# Patient Record
Sex: FEMALE | Race: WHITE | NOT HISPANIC OR LATINO | Employment: FULL TIME | URBAN - METROPOLITAN AREA
[De-identification: names, ages, dates, MRNs, and addresses within clinical notes are randomized per-mention and may not be internally consistent; named-entity substitution may affect disease eponyms.]

---

## 2017-01-23 ENCOUNTER — OFFICE VISIT (OUTPATIENT)
Dept: FAMILY MEDICINE | Facility: CLINIC | Age: 32
End: 2017-01-23
Payer: COMMERCIAL

## 2017-01-23 VITALS
BODY MASS INDEX: 21.82 KG/M2 | WEIGHT: 144 LBS | HEIGHT: 68 IN | DIASTOLIC BLOOD PRESSURE: 69 MMHG | TEMPERATURE: 98 F | HEART RATE: 72 BPM | OXYGEN SATURATION: 99 % | SYSTOLIC BLOOD PRESSURE: 116 MMHG

## 2017-01-23 DIAGNOSIS — M26.629 TMJ TENDERNESS: Primary | ICD-10-CM

## 2017-01-23 PROCEDURE — 99213 OFFICE O/P EST LOW 20 MIN: CPT | Performed by: INTERNAL MEDICINE

## 2017-01-23 RX ORDER — CYCLOBENZAPRINE HCL 10 MG
5-10 TABLET ORAL 3 TIMES DAILY PRN
Qty: 30 TABLET | Refills: 1 | Status: SHIPPED | OUTPATIENT
Start: 2017-01-23 | End: 2017-09-15

## 2017-01-23 NOTE — NURSING NOTE
"Chief Complaint   Patient presents with     Jaw Pain     x 3 months, had an ear infection and was on 3 antibiotics, still having pain in jaw and around ear.        Initial /69 mmHg  Pulse 72  Temp(Src) 98  F (36.7  C) (Tympanic)  Ht 5' 7.75\" (1.721 m)  Wt 144 lb (65.318 kg)  BMI 22.05 kg/m2  SpO2 99% Estimated body mass index is 22.05 kg/(m^2) as calculated from the following:    Height as of this encounter: 5' 7.75\" (1.721 m).    Weight as of this encounter: 144 lb (65.318 kg).  BP completed using cuff size: annamarie BRICE CMA ([facility])          "

## 2017-01-23 NOTE — MR AVS SNAPSHOT
After Visit Summary   1/23/2017    Jonna Pritchard    MRN: [id number]           Patient Information     Date Of Birth          1985        Visit Information        Provider Department      1/23/2017 2:40 PM Mark Chua MD [facility]        Today's Diagnoses     TMJ tenderness    -  1       Care Instructions    The pain may be coming from tightness in the muscles of the jaw- we will try a muscle relaxer to see if that helps.  If not, I would recommend revisiting with your dentist to get a night mouth guard to help with possible clenching of the jaw at night that might be causing the pain.        Follow-ups after your visit        Your next 10 appointments already scheduled     Jan 27, 2017  2:20 PM   PHYSICAL with Iván Traylor [facility]   [facility]    [address] [address] [phone]              Who to contact     If you have questions or need follow up information about today's clinic visit or your schedule please contact [facility] directly at [phone].  Normal or non-critical lab and imaging results will be communicated to you by Liftopiahart, letter or phone within 4 business days after the clinic has received the results. If you do not hear from us within 7 days, please contact the clinic through LumeJett or phone. If you have a critical or abnormal lab result, we will notify you by phone as soon as possible.  Submit refill requests through [email or web address] or call your pharmacy and they will forward the refill request to us. Please allow 3 business days for your refill to be completed.          Additional Information About Your Visit        MyChart Information     [email or web address] gives you secure access to your electronic health record. If you see a primary care provider, you can also send messages to your care team and make appointments. If you have questions, please call your primary care clinic.  If you do  "not have a primary care provider, please call [phone] and they will assist you.        Care EveryWhere ID     This is your Care EveryWhere ID. This could be used by other organizations to access your [facility] medical records  [id number]        Your Vitals Were     Pulse Temperature Height BMI (Body Mass Index) Pulse Oximetry       72 98  F (36.7  C) (Tympanic) 5' 7.75\" (1.721 m) 22.05 kg/m2 99%        Blood Pressure from Last 3 Encounters:   01/23/17 116/69   11/21/16 114/67   11/01/16 118/67    Weight from Last 3 Encounters:   01/23/17 144 lb (65.318 kg)   11/21/16 147 lb (66.679 kg)   11/01/16 146 lb (66.225 kg)              Today, you had the following     No orders found for display         Today's Medication Changes          These changes are accurate as of: 1/23/17  3:05 PM.  If you have any questions, ask your nurse or doctor.               Start taking these medicines.        Dose/Directions    cyclobenzaprine 10 MG tablet   Commonly known as:  FLEXERIL   Used for:  TMJ tenderness   Started by:  Mark Chua MD        Dose:  5-10 mg   Take 0.5-1 tablets (5-10 mg) by mouth 3 times daily as needed for muscle spasms   Quantity:  30 tablet   Refills:  1            Where to get your medicines      These medications were sent to [facility] [address]  [address] [address]     Phone:  [phone]    - cyclobenzaprine 10 MG tablet             Primary Care Provider Office Phone # Fax #    Missael Toth [phone]       [facility] [address] [address]        Thank you!     Thank you for choosing [facility]  for your care. Our goal is always to provide you with excellent care. Hearing back from our patients is one way we can continue to improve our services. Please take a few minutes to complete the written survey that you may receive in the mail after your visit with us. Thank you!   "           Your Updated Medication List - Protect others around you: Learn how to safely use, store and throw away your medicines at www.disposemymeds.org.          This list is accurate as of: 1/23/17  3:05 PM.  Always use your most recent med list.                   Brand Name Dispense Instructions for use    albuterol 108 (90 BASE) MCG/ACT Inhaler    PROAIR HFA/PROVENTIL HFA/VENTOLIN HFA    1 Inhaler    Inhale 2 puffs into the lungs every 6 hours as needed for shortness of breath / dyspnea or wheezing       CALCIUM PO          cyclobenzaprine 10 MG tablet    FLEXERIL    30 tablet    Take 0.5-1 tablets (5-10 mg) by mouth 3 times daily as needed for muscle spasms       MULTIVITAMIN ADULT PO

## 2017-01-23 NOTE — PROGRESS NOTES
SUBJECTIVE:                                                    Jonna Pritchard is a 31 year old female who presents to clinic today for the following health issues:      Concern - left side jaw pain - no known injury     Onset: x 3 months      Description:   Patient reports having ear infection in October, was on 3 antibiotics, still having residual jaw pain and left around ear.  Pain increased w/ jaw movement.  As of recent, pain increased when laying down on right side, feels shooting pain in left jaw and shooting/stabbing pain to head.     Intensity: mild, moderate     Progression of Symptoms:  same    Accompanying Signs & Symptoms:  No swelling, no clicking in jaw.   Throat has been scratchy for a couple days       Previous history of similar problem:   None     Precipitating factors:   Worsened by: opening jaw, eating, laying on right side,     Alleviating factors:  Improved by: none        Therapies Tried and outcome: heat did not help, ibuprofen helped some    She did see her dentist recently, and she does not have any cavities.  They did discuss possibly trying a night mouth guard, but she would not be able to wear her retainers with this.    Problem list and histories reviewed & adjusted, as indicated.  Additional history: as documented    Current Outpatient Prescriptions   Medication Sig Dispense Refill     cyclobenzaprine (FLEXERIL) 10 MG tablet Take 0.5-1 tablets (5-10 mg) by mouth 3 times daily as needed for muscle spasms 30 tablet 1     Multiple Vitamins-Minerals (MULTIVITAMIN ADULT PO)        CALCIUM PO        albuterol (PROAIR HFA, PROVENTIL HFA, VENTOLIN HFA) 108 (90 BASE) MCG/ACT inhaler Inhale 2 puffs into the lungs every 6 hours as needed for shortness of breath / dyspnea or wheezing 1 Inhaler 0     Allergies   Allergen Reactions     Penicillins        ROS:  Constitutional, HEENT systems are negative, except as otherwise noted.    OBJECTIVE:                                                    BP  "116/69 mmHg  Pulse 72  Temp(Src) 98  F (36.7  C) (Tympanic)  Ht 5' 7.75\" (1.721 m)  Wt 144 lb (65.318 kg)  BMI 22.05 kg/m2  SpO2 99%  Body mass index is 22.05 kg/(m^2).  GENERAL: healthy, alert and no distress  HENT: ear canals and TM's normal, nose and mouth without ulcers or lesions, some tightness in muscle below left ear    Diagnostic Test Results:  none      ASSESSMENT/PLAN:                                                        1. TMJ tenderness    She has some tightness in the muscles behind the left TMJ.  Will try some Flexeril to see if that will help relax that muscle.  If no improvement, I recommended she follow-up with her dentist to get the night mouth guard because she may be clenching her jaw at night causing TMJ pain.  Ear looks okay today- do no suspect infection.    - cyclobenzaprine (FLEXERIL) 10 MG tablet; Take 0.5-1 tablets (5-10 mg) by mouth 3 times daily as needed for muscle spasms  Dispense: 30 tablet; Refill: 1    Follow-up as needed.     Mark Chua MD  [facility]  "

## 2017-01-23 NOTE — PATIENT INSTRUCTIONS
The pain may be coming from tightness in the muscles of the jaw- we will try a muscle relaxer to see if that helps.  If not, I would recommend revisiting with your dentist to get a night mouth guard to help with possible clenching of the jaw at night that might be causing the pain.

## 2017-03-06 ENCOUNTER — OFFICE VISIT (OUTPATIENT)
Dept: OBGYN | Facility: CLINIC | Age: 32
End: 2017-03-06
Payer: COMMERCIAL

## 2017-03-06 VITALS
SYSTOLIC BLOOD PRESSURE: 128 MMHG | BODY MASS INDEX: 21.61 KG/M2 | HEIGHT: 68 IN | DIASTOLIC BLOOD PRESSURE: 78 MMHG | WEIGHT: 142.6 LBS | HEART RATE: 93 BPM

## 2017-03-06 DIAGNOSIS — Z01.411 ENCOUNTER FOR GYNECOLOGICAL EXAMINATION WITH ABNORMAL FINDING: Primary | ICD-10-CM

## 2017-03-06 DIAGNOSIS — N89.8 VAGINAL DRYNESS: ICD-10-CM

## 2017-03-06 DIAGNOSIS — N94.19 DYSPAREUNIA DUE TO MEDICAL CONDITION IN FEMALE: ICD-10-CM

## 2017-03-06 DIAGNOSIS — B37.31 CANDIDIASIS OF VULVA AND VAGINA: ICD-10-CM

## 2017-03-06 LAB
MICRO REPORT STATUS: ABNORMAL
SPECIMEN SOURCE: ABNORMAL
WET PREP SPEC: ABNORMAL

## 2017-03-06 PROCEDURE — G0145 SCR C/V CYTO,THINLAYER,RESCR: HCPCS | Performed by: OBSTETRICS & GYNECOLOGY

## 2017-03-06 PROCEDURE — 87210 SMEAR WET MOUNT SALINE/INK: CPT | Performed by: OBSTETRICS & GYNECOLOGY

## 2017-03-06 PROCEDURE — 87624 HPV HI-RISK TYP POOLED RSLT: CPT | Performed by: OBSTETRICS & GYNECOLOGY

## 2017-03-06 PROCEDURE — 99213 OFFICE O/P EST LOW 20 MIN: CPT | Mod: 25 | Performed by: OBSTETRICS & GYNECOLOGY

## 2017-03-06 PROCEDURE — 99385 PREV VISIT NEW AGE 18-39: CPT | Performed by: OBSTETRICS & GYNECOLOGY

## 2017-03-06 RX ORDER — ESTRADIOL 0.5 MG/1
TABLET ORAL
Qty: 14 TABLET | Refills: 1 | Status: SHIPPED | OUTPATIENT
Start: 2017-03-06 | End: 2017-09-15

## 2017-03-06 RX ORDER — FLUCONAZOLE 150 MG/1
TABLET ORAL
Qty: 30 TABLET | Refills: 1 | Status: SHIPPED | OUTPATIENT
Start: 2017-03-06 | End: 2017-09-15

## 2017-03-06 NOTE — NURSING NOTE
"Chief Complaint   Patient presents with     Physical      pain since daughter, painful intercourse       Initial /78 (BP Location: Right arm, Patient Position: Chair, Cuff Size: Adult Regular)  Pulse 93  Ht 5' 8\" (1.727 m)  Wt 142 lb 9.6 oz (64.7 kg)  LMP 2017 (Approximate)  BMI 21.68 kg/m2 Estimated body mass index is 21.68 kg/(m^2) as calculated from the following:    Height as of this encounter: 5' 8\" (1.727 m).    Weight as of this encounter: 142 lb 9.6 oz (64.7 kg).  Medication Reconciliation: complete     Lucita Hand LPN      "

## 2017-03-06 NOTE — PATIENT INSTRUCTIONS
Preventive Health Recommendations  Female Ages 26 - 39  Yearly exam:   See your health care provider every year in order to    Review health changes.     Discuss preventive care.      Review your medicines if you your doctor has prescribed any.    Until age 30: Get a Pap test every three years (more often if you have had an abnormal result).    After age 30: Talk to your doctor about whether you should have a Pap test every 3 years or have a Pap test with HPV screening every 5 years.   You do not need a Pap test if your uterus was removed (hysterectomy) and you have not had cancer.  You should be tested each year for STDs (sexually transmitted diseases), if you're at risk.   Talk to your provider about how often to have your cholesterol checked.  If you are at risk for diabetes, you should have a diabetes test (fasting glucose).  Shots: Get a flu shot each year. Get a tetanus shot every 10 years.   Nutrition:     Eat at least 5 servings of fruits and vegetables each day.    Eat whole-grain bread, whole-wheat pasta and brown rice instead of white grains and rice.    Talk to your provider about Calcium and Vitamin D.     Lifestyle    Exercise at least 150 minutes a week (30 minutes a day, 5 days of the week). This will help you control your weight and prevent disease.    Limit alcohol to one drink per day.    No smoking.     Wear sunscreen to prevent skin cancer.    See your dentist every six months for an exam and cleaning.  1.  Estradiol 0.5 mg vaginally daily for 2 weeks  2.  Lidocaine jelly place on area of tenderness 1 hour prior to intercourse  3.  Vaginal dilators-start with smallest use for 10-30 minutes every day until comfortable and then increase to next largest size.    4.  F/u in 2 months

## 2017-03-06 NOTE — MR AVS SNAPSHOT
After Visit Summary   3/6/2017    Jonna Pritchard    MRN: [id number]           Patient Information     Date Of Birth          1985        Visit Information        Provider Department      3/6/2017 9:00 AM Shawanda Breaux MD [facility]        Today's Diagnoses     Encounter for gynecological examination with abnormal finding    -  1    Dyspareunia due to medical condition in female        Vaginal dryness          Care Instructions      Preventive Health Recommendations  Female Ages 26 - 39  Yearly exam:   See your health care provider every year in order to    Review health changes.     Discuss preventive care.      Review your medicines if you your doctor has prescribed any.    Until age 30: Get a Pap test every three years (more often if you have had an abnormal result).    After age 30: Talk to your doctor about whether you should have a Pap test every 3 years or have a Pap test with HPV screening every 5 years.   You do not need a Pap test if your uterus was removed (hysterectomy) and you have not had cancer.  You should be tested each year for STDs (sexually transmitted diseases), if you're at risk.   Talk to your provider about how often to have your cholesterol checked.  If you are at risk for diabetes, you should have a diabetes test (fasting glucose).  Shots: Get a flu shot each year. Get a tetanus shot every 10 years.   Nutrition:     Eat at least 5 servings of fruits and vegetables each day.    Eat whole-grain bread, whole-wheat pasta and brown rice instead of white grains and rice.    Talk to your provider about Calcium and Vitamin D.     Lifestyle    Exercise at least 150 minutes a week (30 minutes a day, 5 days of the week). This will help you control your weight and prevent disease.    Limit alcohol to one drink per day.    No smoking.     Wear sunscreen to prevent skin cancer.    See your dentist every six months for an exam and cleaning.  1.  Estradiol  "0.5 mg vaginally daily for 2 weeks  2.  Lidocaine jelly place on area of tenderness 1 hour prior to intercourse  3.  Vaginal dilators-start with smallest use for 10-30 minutes every day until comfortable and then increase to next largest size.    4.  F/u in 2 months          Follow-ups after your visit        Who to contact     If you have questions or need follow up information about today's clinic visit or your schedule please contact [facility] directly at [phone].  Normal or non-critical lab and imaging results will be communicated to you by Mirador Financialhart, letter or phone within 4 business days after the clinic has received the results. If you do not hear from us within 7 days, please contact the clinic through Cinexio or phone. If you have a critical or abnormal lab result, we will notify you by phone as soon as possible.  Submit refill requests through Cinexio or call your pharmacy and they will forward the refill request to us. Please allow 3 business days for your refill to be completed.          Additional Information About Your Visit        Cinexio Information     C[employer] gives you secure access to your electronic health record. If you see a primary care provider, you can also send messages to your care team and make appointments. If you have questions, please call your primary care clinic.  If you do not have a primary care provider, please call [phone] and they will assist you.        Care EveryWhere ID     This is your Care EveryWhere ID. This could be used by other organizations to access your [facility] medical records  [id number]        Your Vitals Were     Pulse Height Last Period BMI (Body Mass Index)          93 5' 8\" (1.727 m) 02/17/2017 (Approximate) 21.68 kg/m2         Blood Pressure from Last 3 Encounters:   03/06/17 128/78   01/23/17 116/69   11/21/16 114/67    Weight from Last 3 Encounters:   03/06/17 142 lb 9.6 oz (64.7 kg)   01/23/17 144 lb (65.3 kg)   11/21/16 147 lb " (66.7 kg)              We Performed the Following     HPV High Risk Types DNA Cervical     Pap imaged thin layer diagnostic with HPV (select HPV order below)     Wet prep          Today's Medication Changes          These changes are accurate as of: 3/6/17  9:37 AM.  If you have any questions, ask your nurse or doctor.               Start taking these medicines.        Dose/Directions    estradiol 0.5 MG tablet   Commonly known as:  ESTRACE   Used for:  Dyspareunia due to medical condition in female, Vaginal dryness   Started by:  Shawanda Breaux MD        Place vaginally one daily for 2 weeks   Quantity:  14 tablet   Refills:  1       lidocaine 2 % topical gel   Commonly known as:  XYLOCAINE   Used for:  Vaginal dryness, Dyspareunia due to medical condition in female   Started by:  Shawanda Breaux MD        Place on the painful area 1 hour prior to intercourse   Quantity:  30 mL   Refills:  3            Where to get your medicines      These medications were sent to [facility] [address]  [address] [address]     Phone:  [phone]     estradiol 0.5 MG tablet    lidocaine 2 % topical gel                Primary Care Provider Office Phone # Fax #    Missael Toth [phone]       [facility] [address] [address]        Thank you!     Thank you for choosing [facility]  for your care. Our goal is always to provide you with excellent care. Hearing back from our patients is one way we can continue to improve our services. Please take a few minutes to complete the written survey that you may receive in the mail after your visit with us. Thank you!             Your Updated Medication List - Protect others around you: Learn how to safely use, store and throw away your medicines at www.disposemymeds.org.          This list is accurate as of: 3/6/17  9:37 AM.  Always  use your most recent med list.                   Brand Name Dispense Instructions for use    albuterol 108 (90 BASE) MCG/ACT Inhaler    PROAIR HFA/PROVENTIL HFA/VENTOLIN HFA    1 Inhaler    Inhale 2 puffs into the lungs every 6 hours as needed for shortness of breath / dyspnea or wheezing       CALCIUM PO          cyclobenzaprine 10 MG tablet    FLEXERIL    30 tablet    Take 0.5-1 tablets (5-10 mg) by mouth 3 times daily as needed for muscle spasms       estradiol 0.5 MG tablet    ESTRACE    14 tablet    Place vaginally one daily for 2 weeks       lidocaine 2 % topical gel    XYLOCAINE    30 mL    Place on the painful area 1 hour prior to intercourse       MULTIVITAMIN ADULT PO

## 2017-03-06 NOTE — PROGRESS NOTES
Call to pt to notify of below.  Unable to reach.  Left message for pt to call back     Nusrat Inman   [facility]  RN

## 2017-03-06 NOTE — PROGRESS NOTES
SUBJECTIVE:     CC: Jonna Pritchard is an 31 year old woman who presents for preventive health visit.  She has been having problems with intercourse.  She has always been tight and dry.  It has been worse recently.  It was a long time after she had her prior delivery. It hurts upon entry and can't get in all the way.  It hurts like its rubbing.  A few months ago she felt like she had a yeast infection that resolved.  No vaginal discharge but more sensitive.  It hurt when it got wet.  She having normal menstrual cycles.  She is not presently using contraception, condoms being used.  She was on contraception before.  It took her 3 1/2 years to get pregnant prior.  Her scar still hurts on the sides but it is improving.  It has only happened once or twice the last few months.  She has trouble with tampons because she is so dry.     Healthy Habits:    Do you get at least three servings of calcium containing foods daily (dairy, green leafy vegetables, etc.)? Yes, taking vitamins    Amount of exercise or daily activities, outside of work: 4 day(s) per week    Problems taking medications regularly No, not taking flexeril due to being too sleepy    Medication side effects: No    Have you had an eye exam in the past two years? yes    Do you see a dentist twice per year? yes    Do you have sleep apnea, excessive snoring or daytime drowsiness?no        Painful intercourse, pain at csection site    Today's PHQ-2 Score:   PHQ-2 ( 1999 Pfizer) 3/6/2017   Q1: Little interest or pleasure in doing things 0   Q2: Feeling down, depressed or hopeless 0   PHQ-2 Score 0       Abuse: Current or Past(Physical, Sexual or Emotional)- No  Do you feel safe in your environment - Yes    Social History   Substance Use Topics     Smoking status: Former Smoker     Packs/day: 0.00     Years: 8.00     Quit date: 1/1/2011     Smokeless tobacco: Not on file     Alcohol use 0.0 oz/week     0 Standard drinks or equivalent per week      Comment: rarely  "    The patient does not drink >3 drinks per day nor >7 drinks per week.    No results for input(s): CHOL, HDL, LDL, TRIG, CHOLHDLRATIO, NHDL in the last 31995 hours.    Reviewed orders with patient.  Reviewed health maintenance and updated orders accordingly - Yes    Mammo Decision Support:  Mammogram not appropriate for this patient based on age.    Pertinent mammograms are reviewed under the imaging tab.  History of abnormal Pap smear: NO - age 30- 65 PAP every 3 years recommended    Reviewed and updated as needed this visit by clinical staff  Tobacco  Allergies  Meds  Med Hx  Surg Hx  Fam Hx  Soc Hx        Reviewed and updated as needed this visit by Provider        History reviewed. No pertinent past medical history.   Past Surgical History   Procedure Laterality Date      section  2015       ROS:  C: NEGATIVE for fever, chills, change in weight  I: NEGATIVE for worrisome rashes, moles or lesions  E: NEGATIVE for vision changes or irritation  ENT: NEGATIVE for ear, mouth and throat problems  R: NEGATIVE for significant cough or SOB  B: NEGATIVE for masses, tenderness or discharge  CV: NEGATIVE for chest pain, palpitations or peripheral edema  GI: NEGATIVE for nausea, abdominal pain, heartburn, or change in bowel habits  : NEGATIVE for unusual urinary or vaginal symptoms. Periods are regular.  M: NEGATIVE for significant arthralgias or myalgia  N: NEGATIVE for weakness, dizziness or paresthesias  P: NEGATIVE for changes in mood or affect    Problem list, Medication list, Allergies, and Medical/Social/Surgical histories reviewed in [facility] and updated as appropriate.  OBJECTIVE:     /78 (BP Location: Right arm, Patient Position: Chair, Cuff Size: Adult Regular)  Pulse 93  Ht 5' 8\" (1.727 m)  Wt 142 lb 9.6 oz (64.7 kg)  LMP 2017 (Approximate)  BMI 21.68 kg/m2  EXAM:  GENERAL: healthy, alert and no distress  EYES: Eyes grossly normal to inspection, PERRL and conjunctivae and sclerae " normal  HENT: ear canals and TM's normal, nose and mouth without ulcers or lesions  NECK: no adenopathy, no asymmetry, masses, or scars and thyroid normal to palpation  RESP: lungs clear to auscultation - no rales, rhonchi or wheezes  BREAST: normal without masses, tenderness or nipple discharge and no palpable axillary masses or adenopathy  CV: regular rate and rhythm, normal S1 S2, no S3 or S4, no murmur, click or rub, no peripheral edema and peripheral pulses strong  ABDOMEN: soft, nontender, no hepatosplenomegaly, no masses and bowel sounds normal   (female): normal female external genitalia, normal urethral meatus, vaginal mucosa pale, dry, thin, and normal cervix/adnexa/uterus without masses or discharge  Noted with exam prominent posterior vaginal muscles, no masses, able to smooth down that area, hymen normal and open  Rectum-no masses  MS: no gross musculoskeletal defects noted, no edema  SKIN: no suspicious lesions or rashes  NEURO: Normal strength and tone, mentation intact and speech normal  PSYCH: mentation appears normal, affect normal/bright    ASSESSMENT/PLAN:     ASSESSMENT / PLAN:  (Z01.411) Encounter for gynecological examination with abnormal finding  (primary encounter diagnosis)  Comment: normal except for the vaginal atrophy  Plan: see below    (N94.19) Dyspareunia due to medical condition in female  Comment: due to the prominent muscularity and the dryness  Plan: estradiol (ESTRACE) 0.5 MG tablet, lidocaine         (XYLOCAINE) 2 % topical gel            (N89.8) Vaginal dryness  Comment: see below  Plan: estradiol (ESTRACE) 0.5 MG tablet, lidocaine         (XYLOCAINE) 2 % topical gel            1.  Estradiol 0.5 mg vaginally daily for 2 weeks  2.  Lidocaine jelly place on area of tenderness 1 hour prior to intercourse  3.  Vaginal dilators-start with smallest use for 10-30 minutes every day until comfortable and then increase to next largest size.    4.  F/u in 2 months          COUNSELING:  "  Reviewed preventive health counseling, as reflected in patient instructions         reports that she quit smoking about 6 years ago. She smoked 0.00 packs per day for 8.00 years. She does not have any smokeless tobacco history on file.    Estimated body mass index is 21.68 kg/(m^2) as calculated from the following:    Height as of this encounter: 5' 8\" (1.727 m).    Weight as of this encounter: 142 lb 9.6 oz (64.7 kg).       Counseling Resources:  ATP IV Guidelines  Pooled Cohorts Equation Calculator  Breast Cancer Risk Calculator  FRAX Risk Assessment  ICSI Preventive Guidelines  Dietary Guidelines for Americans, 2010  USDA's MyPlate  ASA Prophylaxis  Lung CA Screening    Shawanda Breaux MD  [facility]  "

## 2017-03-08 LAB
COPATH REPORT: NORMAL
PAP: NORMAL

## 2017-03-09 LAB
FINAL DIAGNOSIS: NORMAL
HPV HR 12 DNA CVX QL NAA+PROBE: NEGATIVE
HPV16 DNA SPEC QL NAA+PROBE: NEGATIVE
HPV18 DNA SPEC QL NAA+PROBE: NEGATIVE
SPECIMEN DESCRIPTION: NORMAL

## 2017-09-15 ENCOUNTER — OFFICE VISIT (OUTPATIENT)
Dept: FAMILY MEDICINE | Facility: CLINIC | Age: 32
End: 2017-09-15
Payer: COMMERCIAL

## 2017-09-15 VITALS
BODY MASS INDEX: 21.65 KG/M2 | HEART RATE: 82 BPM | SYSTOLIC BLOOD PRESSURE: 120 MMHG | TEMPERATURE: 98.4 F | WEIGHT: 142.4 LBS | DIASTOLIC BLOOD PRESSURE: 69 MMHG

## 2017-09-15 DIAGNOSIS — F41.9 ANXIETY: Primary | ICD-10-CM

## 2017-09-15 PROCEDURE — 99214 OFFICE O/P EST MOD 30 MIN: CPT | Performed by: FAMILY MEDICINE

## 2017-09-15 ASSESSMENT — ANXIETY QUESTIONNAIRES
3. WORRYING TOO MUCH ABOUT DIFFERENT THINGS: NEARLY EVERY DAY
1. FEELING NERVOUS, ANXIOUS, OR ON EDGE: NEARLY EVERY DAY
7. FEELING AFRAID AS IF SOMETHING AWFUL MIGHT HAPPEN: NEARLY EVERY DAY
5. BEING SO RESTLESS THAT IT IS HARD TO SIT STILL: NOT AT ALL
6. BECOMING EASILY ANNOYED OR IRRITABLE: MORE THAN HALF THE DAYS
GAD7 TOTAL SCORE: 16
2. NOT BEING ABLE TO STOP OR CONTROL WORRYING: NEARLY EVERY DAY
IF YOU CHECKED OFF ANY PROBLEMS ON THIS QUESTIONNAIRE, HOW DIFFICULT HAVE THESE PROBLEMS MADE IT FOR YOU TO DO YOUR WORK, TAKE CARE OF THINGS AT HOME, OR GET ALONG WITH OTHER PEOPLE: NOT DIFFICULT AT ALL

## 2017-09-15 ASSESSMENT — PATIENT HEALTH QUESTIONNAIRE - PHQ9
5. POOR APPETITE OR OVEREATING: MORE THAN HALF THE DAYS
SUM OF ALL RESPONSES TO PHQ QUESTIONS 1-9: 7

## 2017-09-15 NOTE — MR AVS SNAPSHOT
After Visit Summary   9/15/2017    Jonna Pritchard    MRN: [id number]           Patient Information     Date Of Birth          1985        Visit Information        Provider Department      9/15/2017 9:20 AM Loretta Andrews MD [facility]        Today's Diagnoses     Anxiety    -  1       Follow-ups after your visit        Who to contact     If you have questions or need follow up information about today's clinic visit or your schedule please contact [facility] directly at [phone].  Normal or non-critical lab and imaging results will be communicated to you by Fragegghart, letter or phone within 4 business days after the clinic has received the results. If you do not hear from us within 7 days, please contact the clinic through Fragegghart or phone. If you have a critical or abnormal lab result, we will notify you by phone as soon as possible.  Submit refill requests through [employer] ([employer]) or call your pharmacy and they will forward the refill request to us. Please allow 3 business days for your refill to be completed.          Additional Information About Your Visit        MyChart Information     [employer] ([employer]) gives you secure access to your electronic health record. If you see a primary care provider, you can also send messages to your care team and make appointments. If you have questions, please call your primary care clinic.  If you do not have a primary care provider, please call [phone] and they will assist you.        Care EveryWhere ID     This is your Care EveryWhere ID. This could be used by other organizations to access your [facility] medical records  [id number]        Your Vitals Were     Pulse Temperature BMI (Body Mass Index)             82 98.4  F (36.9  C) (Tympanic) 21.65 kg/m2          Blood Pressure from Last 3 Encounters:   09/15/17 120/69   03/06/17 128/78   01/23/17 116/69    Weight from Last 3 Encounters:   09/15/17 142 lb 6.4 oz (64.6 kg)   03/06/17 142  lb 9.6 oz (64.7 kg)   01/23/17 144 lb (65.3 kg)              Today, you had the following     No orders found for display         Today's Medication Changes          These changes are accurate as of: 9/15/17  9:43 AM.  If you have any questions, ask your nurse or doctor.               Start taking these medicines.        Dose/Directions    FLUoxetine 20 MG capsule   Commonly known as:  PROzac   Used for:  Anxiety   Started by:  Loretta Andrews MD        Dose:  20 mg   Take 1 capsule (20 mg) by mouth daily   Quantity:  30 capsule   Refills:  11         Stop taking these medicines if you haven't already. Please contact your care team if you have questions.     albuterol 108 (90 BASE) MCG/ACT Inhaler   Commonly known as:  PROAIR HFA/PROVENTIL HFA/VENTOLIN HFA   Stopped by:  Loretta Andrews MD           cyclobenzaprine 10 MG tablet   Commonly known as:  FLEXERIL   Stopped by:  Loretta Andrews MD           estradiol 0.5 MG tablet   Commonly known as:  ESTRACE   Stopped by:  Loretta Andrews MD           fluconazole 150 MG tablet   Commonly known as:  DIFLUCAN   Stopped by:  Loretta Andrews MD           lidocaine 2 % topical gel   Commonly known as:  XYLOCAINE   Stopped by:  Loretta Andrews MD                Where to get your medicines      These medications were sent to [facility] [address] [facility]  [address] [place] [address]     Phone:  [phone]     FLUoxetine 20 MG capsule                Primary Care Provider Office Phone # Fax #    Missael Hood Toth [phone]       [address] [facility] [address]        Equal Access to Services     YAHIR BISHOP AH: Hadii porfirio barillas Sodwayne, waaxda luqadaha, qaybta kaalmada paolo morales. So [facility] [phone].    ATENCIÓN: Si habla español, tiene a fry disposición servicios gratuitos de asistencia lingüística. Llame al [phone].    We comply  with applicable federal civil rights laws and [place] laws. We do not discriminate on the basis of race, color, national origin, age, disability sex, sexual orientation or gender identity.            Thank you!     Thank you for choosing [facility]  for your care. Our goal is always to provide you with excellent care. Hearing back from our patients is one way we can continue to improve our services. Please take a few minutes to complete the written survey that you may receive in the mail after your visit with us. Thank you!             Your Updated Medication List - Protect others around you: Learn how to safely use, store and throw away your medicines at www.disposemymeds.org.          This list is accurate as of: 9/15/17  9:43 AM.  Always use your most recent med list.                   Brand Name Dispense Instructions for use Diagnosis    CALCIUM PO           FLUoxetine 20 MG capsule    PROzac    30 capsule    Take 1 capsule (20 mg) by mouth daily    Anxiety       IBUPROFEN PO      Take by mouth as needed for moderate pain        MULTIVITAMIN ADULT PO

## 2017-09-15 NOTE — NURSING NOTE
"Initial /69  Pulse 82  Temp 98.4  F (36.9  C) (Tympanic)  Wt 142 lb 6.4 oz (64.6 kg)  BMI 21.65 kg/m2 Estimated body mass index is 21.65 kg/(m^2) as calculated from the following:    Height as of 3/6/17: 5' 8\" (1.727 m).    Weight as of this encounter: 142 lb 6.4 oz (64.6 kg). .      "

## 2017-09-15 NOTE — PROGRESS NOTES
SUBJECTIVE:                                                    Jonna Pritchard is 32 year old female   Chief Complaint   Patient presents with     Anxiety     Possible Anxiety and Panic Attacks    Has history of OCD with repetitive checking.   This has worsened since having a child  Feels out of breath, cannot sleep, cannot control worrying, very anxious, clenching jaw more  Migraines have worsened.  Was put on an inhaler, does not feel that this is helping       Problem list and histories reviewed & adjusted, as indicated.  Additional history: worries about toddler, will not let anyone take care of her, admits needs a break    Patient Active Problem List   Diagnosis     History of  section     Past Surgical History:   Procedure Laterality Date      SECTION  2015       Social History   Substance Use Topics     Smoking status: Former Smoker     Packs/day: 0.00     Years: 8.00     Quit date: 2011     Smokeless tobacco: Never Used     Alcohol use 0.0 oz/week     0 Standard drinks or equivalent per week      Comment: rarely     Family History   Problem Relation Age of Onset     DIABETES Father 45     Hyperlipidemia Father      DIABETES Maternal Grandfather      DIABETES Paternal Grandmother      Hyperlipidemia Paternal Grandmother      Family History Negative Mother      Hyperlipidemia Mother          Current Outpatient Prescriptions   Medication Sig Dispense Refill     IBUPROFEN PO Take by mouth as needed for moderate pain       FLUoxetine (PROZAC) 20 MG capsule Take 1 capsule (20 mg) by mouth daily 30 capsule 11     Multiple Vitamins-Minerals (MULTIVITAMIN ADULT PO)        CALCIUM PO        Allergies   Allergen Reactions     Penicillins      No lab results found.   BP Readings from Last 3 Encounters:   09/15/17 120/69   17 128/78   17 116/69    Wt Readings from Last 3 Encounters:   09/15/17 142 lb 6.4 oz (64.6 kg)   17 142 lb 9.6 oz (64.7 kg)   17 144 lb (65.3 kg)          ROS:  Constitutional, HEENT, cardiovascular, pulmonary, gi and gu systems are negative, except as otherwise noted.    OBJECTIVE:                                                    /69  Pulse 82  Temp 98.4  F (36.9  C) (Tympanic)  Wt 142 lb 6.4 oz (64.6 kg)  BMI 21.65 kg/m2  GENERAL APPEARANCE ADULT: Alert, no acute distress  PSYCH: mentation appears normal., anxious, gets more anxious as toddler gets restless  Diagnostic Test Results:  PHQ-9 Interpretation:  0-9  Not Major Depression  10-19  Mild/Moderate Depression monthly contacts, meds and therapy  20-27  Severe Depression, weekly contacts, meds and therapy  No flowsheet data found.GAD7 score   Interpretation:    0-5  mild anxiety,   6-10 moderate anxiety   11-15 severe anxiety  Last 3 GAD7 scores on record =  No flowsheet data found.       ASSESSMENT/PLAN:                                                    1. Anxiety  Severe, with mild depression.  Recheck in 2 weeks.  COGNATIVE BEHAVIORAL THERAPY and meditation options discussed, declined.  - FLUoxetine (PROZAC) 20 MG capsule; Take 1 capsule (20 mg) by mouth daily  Dispense: 30 capsule; Refill: 11    Loretta Andrews MD  [facility]

## 2017-09-16 ASSESSMENT — ANXIETY QUESTIONNAIRES: GAD7 TOTAL SCORE: 16

## 2017-09-18 PROBLEM — F41.9 ANXIETY: Status: ACTIVE | Noted: 2017-09-18

## 2017-09-19 ENCOUNTER — OFFICE VISIT (OUTPATIENT)
Dept: FAMILY MEDICINE | Facility: CLINIC | Age: 32
End: 2017-09-19
Payer: COMMERCIAL

## 2017-09-19 VITALS
WEIGHT: 142 LBS | SYSTOLIC BLOOD PRESSURE: 106 MMHG | DIASTOLIC BLOOD PRESSURE: 70 MMHG | BODY MASS INDEX: 21.59 KG/M2 | TEMPERATURE: 98.6 F

## 2017-09-19 DIAGNOSIS — G43.809 OTHER MIGRAINE WITHOUT STATUS MIGRAINOSUS, NOT INTRACTABLE: ICD-10-CM

## 2017-09-19 DIAGNOSIS — F41.9 ANXIETY: Primary | ICD-10-CM

## 2017-09-19 PROCEDURE — 99214 OFFICE O/P EST MOD 30 MIN: CPT | Performed by: FAMILY MEDICINE

## 2017-09-19 RX ORDER — SUMATRIPTAN 25 MG/1
25-50 TABLET, FILM COATED ORAL
Qty: 18 TABLET | Refills: 1 | Status: SHIPPED | OUTPATIENT
Start: 2017-09-19 | End: 2021-04-16

## 2017-09-19 RX ORDER — CITALOPRAM HYDROBROMIDE 10 MG/1
10 TABLET ORAL DAILY
Qty: 30 TABLET | Refills: 11 | Status: SHIPPED | OUTPATIENT
Start: 2017-09-19 | End: 2018-01-30 | Stop reason: DRUGHIGH

## 2017-09-19 NOTE — PROGRESS NOTES
SUBJECTIVE:                                                    Jonna Pritchard is 32 year old female   Chief Complaint   Patient presents with     Medication Problem     Prozac making her feel sick     Symptoms appeared 17 after starting Prozac on 17  Nausea  Increased tingling in legs  Chest/Heart burn   Diarrhea  Issues sleeping, restlessness (Is taking med before bed)  Last dose was last night 17.    Problem list and histories reviewed & adjusted, as indicated.  Additional history: as documented  PHQ-9 Interpretation:  0-9  Not Major Depression  10-19  Mild/Moderate Depression monthly contacts, meds and therapy  20-27  Severe Depression, weekly contacts, meds and therapy  PHQ-9 SCORE 9/15/2017   Total Score 7   GAD7 score   Interpretation:    0-5  mild anxiety,   6-10 moderate anxiety   11-15 severe anxiety  Last 3 GAD7 scores on record =  ABDOUL-7 SCORE 9/15/2017   Total Score 16           Getting migraines, were gone during pregnancy, restarted 6-8 months after delivery.  Got Fioricet   in the past      Patient Active Problem List   Diagnosis     History of  section     Anxiety     Past Surgical History:   Procedure Laterality Date      SECTION  2015       Social History   Substance Use Topics     Smoking status: Former Smoker     Packs/day: 0.00     Years: 8.00     Quit date: 2011     Smokeless tobacco: Never Used     Alcohol use 0.0 oz/week     0 Standard drinks or equivalent per week      Comment: rarely     Family History   Problem Relation Age of Onset     DIABETES Father 45     Hyperlipidemia Father      DIABETES Maternal Grandfather      DIABETES Paternal Grandmother      Hyperlipidemia Paternal Grandmother      Family History Negative Mother      Hyperlipidemia Mother          Current Outpatient Prescriptions   Medication Sig Dispense Refill     citalopram (CELEXA) 10 MG tablet Take 1 tablet (10 mg) by mouth daily 30 tablet 11     SUMAtriptan (IMITREX) 25 MG tablet  Take 1-2 tablets (25-50 mg) by mouth at onset of headache for migraine May repeat in 2 hours. Max 8 tablets/24 hours. 18 tablet 1     IBUPROFEN PO Take by mouth as needed for moderate pain       Multiple Vitamins-Minerals (MULTIVITAMIN ADULT PO)        CALCIUM PO        Allergies   Allergen Reactions     Penicillins      No lab results found.   BP Readings from Last 3 Encounters:   09/19/17 106/70   09/15/17 120/69   03/06/17 128/78    Wt Readings from Last 3 Encounters:   09/19/17 142 lb (64.4 kg)   09/15/17 142 lb 6.4 oz (64.6 kg)   03/06/17 142 lb 9.6 oz (64.7 kg)         ROS:  Constitutional, HEENT, cardiovascular, pulmonary, gi and gu systems are negative, except as otherwise noted.    OBJECTIVE:                                                    /70  Temp 98.6  F (37  C) (Tympanic)  Wt 142 lb (64.4 kg)  BMI 21.59 kg/m2  GENERAL APPEARANCE ADULT: Alert, no acute distress  PSYCH: mentation appears normal., anxious  Diagnostic Test Results:  none      ASSESSMENT/PLAN:                                                    1. Anxiety  intollerant to prozac, change to celexa, recheck 2 weeks  - citalopram (CELEXA) 10 MG tablet; Take 1 tablet (10 mg) by mouth daily  Dispense: 30 tablet; Refill: 11    2. Other migraine without status migrainosus, not intractable  - SUMAtriptan (IMITREX) 25 MG tablet; Take 1-2 tablets (25-50 mg) by mouth at onset of headache for migraine May repeat in 2 hours. Max 8 tablets/24 hours.  Dispense: 18 tablet; Refill: 1    Loretta Andrews MD  [facility]

## 2017-09-19 NOTE — MR AVS SNAPSHOT
After Visit Summary   9/19/2017    Jonna Pritchard    MRN: [id number]           Patient Information     Date Of Birth          1985        Visit Information        Provider Department      9/19/2017 2:00 PM Loretta Andrews MD [facility]        Today's Diagnoses     Anxiety    -  1    Other migraine without status migrainosus, not intractable           Follow-ups after your visit        Your next 10 appointments already scheduled     Sep 28, 2017  7:40 AM CDT   SHORT with Loretta Andrews MD   [facility]    [address] [address]   [phone]              Who to contact     If you have questions or need follow up information about today's clinic visit or your schedule please contact [facility] directly at [phone].  Normal or non-critical lab and imaging results will be communicated to you by MyChart, letter or phone within 4 business days after the clinic has received the results. If you do not hear from us within 7 days, please contact the clinic through MyChart or phone. If you have a critical or abnormal lab result, we will notify you by phone as soon as possible.  Submit refill requests through [employer] or call your pharmacy and they will forward the refill request to us. Please allow 3 business days for your refill to be completed.          Additional Information About Your Visit        MyChart Information     [employer] gives you secure access to your electronic health record. If you see a primary care provider, you can also send messages to your care team and make appointments. If you have questions, please call your primary care clinic.  If you do not have a primary care provider, please call [phone] and they will assist you.        Care EveryWhere ID     This is your Care EveryWhere ID. This could be used by other organizations to access your [place] medical records  [id number]         Your Vitals Were     Temperature BMI (Body Mass Index)                98.6  F (37  C) (Tympanic) 21.59 kg/m2           Blood Pressure from Last 3 Encounters:   09/19/17 106/70   09/15/17 120/69   03/06/17 128/78    Weight from Last 3 Encounters:   09/19/17 142 lb (64.4 kg)   09/15/17 142 lb 6.4 oz (64.6 kg)   03/06/17 142 lb 9.6 oz (64.7 kg)              Today, you had the following     No orders found for display         Today's Medication Changes          These changes are accurate as of: 9/19/17  2:18 PM.  If you have any questions, ask your nurse or doctor.               Start taking these medicines.        Dose/Directions    citalopram 10 MG tablet   Commonly known as:  celeXA   Used for:  Anxiety   Started by:  Loretta Andrews MD        Dose:  10 mg   Take 1 tablet (10 mg) by mouth daily   Quantity:  30 tablet   Refills:  11       SUMAtriptan 25 MG tablet   Commonly known as:  IMITREX   Used for:  Other migraine without status migrainosus, not intractable   Started by:  Loretta Andrews MD        Dose:  25-50 mg   Take 1-2 tablets (25-50 mg) by mouth at onset of headache for migraine May repeat in 2 hours. Max 8 tablets/24 hours.   Quantity:  18 tablet   Refills:  1         Stop taking these medicines if you haven't already. Please contact your care team if you have questions.     FLUoxetine 20 MG capsule   Commonly known as:  PROzac   Stopped by:  Loretta Andrews MD                Where to get your medicines      These medications were sent to [id number] - [place], [place] - [address]  [address] [address]     Phone:  [phone]     citalopram 10 MG tablet    SUMAtriptan 25 MG tablet                Primary Care Provider Office Phone # Fax #    Missael Toth [phone] [address] [address]        Equal Access to Services     YAHIR BISHOP AH: milton Cook qaybta  paolo millerkvng ogden ah. So [facility] [phone].    ATENCIÓN: Si jennifer puri, tiene a fry disposición servicios gratuitos de asistencia lingüística. Sai al [phone].    We comply with applicable federal civil rights laws and [place] laws. We do not discriminate on the basis of race, color, national origin, age, disability sex, sexual orientation or gender identity.            Thank you!     Thank you for choosing [facility]  for your care. Our goal is always to provide you with excellent care. Hearing back from our patients is one way we can continue to improve our services. Please take a few minutes to complete the written survey that you may receive in the mail after your visit with us. Thank you!             Your Updated Medication List - Protect others around you: Learn how to safely use, store and throw away your medicines at www.disposemymeds.org.          This list is accurate as of: 9/19/17  2:18 PM.  Always use your most recent med list.                   Brand Name Dispense Instructions for use Diagnosis    CALCIUM PO           citalopram 10 MG tablet    celeXA    30 tablet    Take 1 tablet (10 mg) by mouth daily    Anxiety       IBUPROFEN PO      Take by mouth as needed for moderate pain        MULTIVITAMIN ADULT PO           SUMAtriptan 25 MG tablet    IMITREX    18 tablet    Take 1-2 tablets (25-50 mg) by mouth at onset of headache for migraine May repeat in 2 hours. Max 8 tablets/24 hours.    Other migraine without status migrainosus, not intractable

## 2017-09-25 PROBLEM — G43.809 OTHER MIGRAINE WITHOUT STATUS MIGRAINOSUS, NOT INTRACTABLE: Status: ACTIVE | Noted: 2017-09-25

## 2017-10-17 ENCOUNTER — OFFICE VISIT (OUTPATIENT)
Dept: FAMILY MEDICINE | Facility: CLINIC | Age: 32
End: 2017-10-17
Payer: COMMERCIAL

## 2017-10-17 VITALS
TEMPERATURE: 98.7 F | BODY MASS INDEX: 21.79 KG/M2 | DIASTOLIC BLOOD PRESSURE: 65 MMHG | HEART RATE: 73 BPM | WEIGHT: 143.8 LBS | HEIGHT: 68 IN | SYSTOLIC BLOOD PRESSURE: 106 MMHG

## 2017-10-17 DIAGNOSIS — F41.9 ANXIETY: Primary | ICD-10-CM

## 2017-10-17 DIAGNOSIS — G43.809 OTHER MIGRAINE WITHOUT STATUS MIGRAINOSUS, NOT INTRACTABLE: ICD-10-CM

## 2017-10-17 PROCEDURE — 99214 OFFICE O/P EST MOD 30 MIN: CPT | Performed by: FAMILY MEDICINE

## 2017-10-17 RX ORDER — CITALOPRAM HYDROBROMIDE 20 MG/1
20 TABLET ORAL DAILY
Qty: 90 TABLET | Refills: 3 | Status: SHIPPED | OUTPATIENT
Start: 2017-10-17 | End: 2018-10-25

## 2017-10-17 ASSESSMENT — PATIENT HEALTH QUESTIONNAIRE - PHQ9
SUM OF ALL RESPONSES TO PHQ QUESTIONS 1-9: 2
5. POOR APPETITE OR OVEREATING: SEVERAL DAYS

## 2017-10-17 ASSESSMENT — ANXIETY QUESTIONNAIRES
7. FEELING AFRAID AS IF SOMETHING AWFUL MIGHT HAPPEN: NOT AT ALL
3. WORRYING TOO MUCH ABOUT DIFFERENT THINGS: SEVERAL DAYS
5. BEING SO RESTLESS THAT IT IS HARD TO SIT STILL: NOT AT ALL
6. BECOMING EASILY ANNOYED OR IRRITABLE: SEVERAL DAYS
GAD7 TOTAL SCORE: 3
1. FEELING NERVOUS, ANXIOUS, OR ON EDGE: NOT AT ALL
2. NOT BEING ABLE TO STOP OR CONTROL WORRYING: NOT AT ALL

## 2017-10-17 NOTE — PROGRESS NOTES
SUBJECTIVE:                                                    Jonna Pritchard is 32 year old female   Chief Complaint   Patient presents with     Anxiety     Anxiety Follow-Up    Status since last visit: Started taking Celexa 3 weeks ago. Noticed decrease in panic attacks during commute, but feels there could be more improvement    Other associated symptoms:None    Complicating factors:   Significant life event: No   Current substance abuse: None  Depression symptoms: No  ABDOUL-7 SCORE 9/15/2017 10/17/2017   Total Score 16 3       GAD7      Has not yet needed to use imitrex, also wanted to get used to Celexa before trying.          Problem list and histories reviewed & adjusted, as indicated.  Additional history: as documented  Patient Active Problem List   Diagnosis     History of  section     Anxiety     Other migraine without status migrainosus, not intractable     Past Surgical History:   Procedure Laterality Date      SECTION  2015       Social History   Substance Use Topics     Smoking status: Former Smoker     Packs/day: 0.00     Years: 8.00     Quit date: 2011     Smokeless tobacco: Never Used     Alcohol use 0.0 oz/week     0 Standard drinks or equivalent per week      Comment: rarely     Family History   Problem Relation Age of Onset     DIABETES Father 45     Hyperlipidemia Father      DIABETES Maternal Grandfather      DIABETES Paternal Grandmother      Hyperlipidemia Paternal Grandmother      Family History Negative Mother      Hyperlipidemia Mother          Current Outpatient Prescriptions   Medication Sig Dispense Refill     citalopram (CELEXA) 20 MG tablet Take 1 tablet (20 mg) by mouth daily 90 tablet 3     citalopram (CELEXA) 10 MG tablet Take 1 tablet (10 mg) by mouth daily 30 tablet 11     IBUPROFEN PO Take by mouth as needed for moderate pain       Multiple Vitamins-Minerals (MULTIVITAMIN ADULT PO)        CALCIUM PO        SUMAtriptan (IMITREX) 25 MG tablet Take 1-2  "tablets (25-50 mg) by mouth at onset of headache for migraine May repeat in 2 hours. Max 8 tablets/24 hours. (Patient not taking: Reported on 10/17/2017) 18 tablet 1     Allergies   Allergen Reactions     Penicillins      No lab results found.   BP Readings from Last 3 Encounters:   10/17/17 106/65   09/19/17 106/70   09/15/17 120/69    Wt Readings from Last 3 Encounters:   10/17/17 143 lb 12.8 oz (65.2 kg)   09/19/17 142 lb (64.4 kg)   09/15/17 142 lb 6.4 oz (64.6 kg)         ROS:  Constitutional, HEENT, cardiovascular, pulmonary, gi and gu systems are negative, except as otherwise noted.    OBJECTIVE:                                                    /65  Pulse 73  Temp 98.7  F (37.1  C) (Tympanic)  Ht 5' 8\" (1.727 m)  Wt 143 lb 12.8 oz (65.2 kg)  BMI 21.86 kg/m2  GENERAL APPEARANCE ADULT: Alert, no acute distress  PSYCH: mentation appears normal., affect and mood normal  Diagnostic Test Results:  PHQ-9 Interpretation:  0-9  Not Major Depression  10-19  Mild/Moderate Depression monthly contacts, meds and therapy  20-27  Severe Depression, weekly contacts, meds and therapy  PHQ-9 SCORE 9/15/2017 10/17/2017   Total Score 7 2   GAD7 score   Interpretation:    0-5  mild anxiety,   6-10 moderate anxiety   11-15 severe anxiety  Last 3 GAD7 scores on record =  ABDOUL-7 SCORE 9/15/2017 10/17/2017   Total Score 16 3                  ASSESSMENT/PLAN:                                                    1. Anxiety  Much improved, thinks needs higher dose than 10 mg, increased to 20.  Recheck in 1 month if not at goal.  - citalopram (CELEXA) 20 MG tablet; Take 1 tablet (20 mg) by mouth daily  Dispense: 90 tablet; Refill: 3    2. Other migraine without status migrainosus, not intractable  Fewer headaches since starting celexa, has not tried Imitrex yet.      Loretta Andrews MD  [facility]    "

## 2017-10-17 NOTE — MR AVS SNAPSHOT
"              After Visit Summary   10/17/2017    Jonna Pritchard    MRN: [id number]           Patient Information     Date Of Birth          1985        Visit Information        Provider Department      10/17/2017 7:20 AM Loretta Andrews MD [facility]        Today's Diagnoses     Anxiety    -  1    Other migraine without status migrainosus, not intractable           Follow-ups after your visit        Who to contact     If you have questions or need follow up information about today's clinic visit or your schedule please contact [facility] directly at [phone].  Normal or non-critical lab and imaging results will be communicated to you by [employer], letter or phone within 4 business days after the clinic has received the results. If you do not hear from us within 7 days, please contact the clinic through [employer] or phone. If you have a critical or abnormal lab result, we will notify you by phone as soon as possible.  Submit refill requests through [employer] or call your pharmacy and they will forward the refill request to us. Please allow 3 business days for your refill to be completed.          Additional Information About Your Visit        MyChart Information     [employer] gives you secure access to your electronic health record. If you see a primary care provider, you can also send messages to your care team and make appointments. If you have questions, please call your primary care clinic.  If you do not have a primary care provider, please call [phone] and they will assist you.        Care EveryWhere ID     This is your Care EveryWhere ID. This could be used by other organizations to access your [place] medical records  [id number]        Your Vitals Were     Pulse Temperature Height BMI (Body Mass Index)          73 98.7  F (37.1  C) (Tympanic) 5' 8\" (1.727 m) 21.86 kg/m2         Blood Pressure from Last 3 Encounters:   10/17/17 106/65   09/19/17 106/70   09/15/17 " 120/69    Weight from Last 3 Encounters:   10/17/17 143 lb 12.8 oz (65.2 kg)   09/19/17 142 lb (64.4 kg)   09/15/17 142 lb 6.4 oz (64.6 kg)              Today, you had the following     No orders found for display         Today's Medication Changes          These changes are accurate as of: 10/17/17  7:58 AM.  If you have any questions, ask your nurse or doctor.               These medicines have changed or have updated prescriptions.        Dose/Directions    * citalopram 10 MG tablet   Commonly known as:  celeXA   This may have changed:  Another medication with the same name was added. Make sure you understand how and when to take each.   Used for:  Anxiety        Dose:  10 mg   Take 1 tablet (10 mg) by mouth daily   Quantity:  30 tablet   Refills:  11       * citalopram 20 MG tablet   Commonly known as:  celeXA   This may have changed:  You were already taking a medication with the same name, and this prescription was added. Make sure you understand how and when to take each.   Used for:  Anxiety        Dose:  20 mg   Take 1 tablet (20 mg) by mouth daily   Quantity:  90 tablet   Refills:  3       * Notice:  This list has 2 medication(s) that are the same as other medications prescribed for you. Read the directions carefully, and ask your doctor or other care provider to review them with you.         Where to get your medicines      These medications were sent to [facility][id number] - [place], [place] - [address]  [address] [address]     Phone:  [phone]     citalopram 20 MG tablet                Primary Care Provider Office Phone # Fax #    Missael Toth [phone] [address] [address]        Equal Access to Services     MELIZA BISHOP : Kyle Sharp, milton khan, paolo fountain. So [facility] [phone].    ATENCIÓN: Si holden john fry  disposición servicios gratuitos de asistencia lingüística. Sai mendez [phone].    We comply with applicable federal civil rights laws and [place] laws. We do not discriminate on the basis of race, color, national origin, age, disability, sex, sexual orientation, or gender identity.            Thank you!     Thank you for choosing [facility]  for your care. Our goal is always to provide you with excellent care. Hearing back from our patients is one way we can continue to improve our services. Please take a few minutes to complete the written survey that you may receive in the mail after your visit with us. Thank you!             Your Updated Medication List - Protect others around you: Learn how to safely use, store and throw away your medicines at www.disposemymeds.org.          This list is accurate as of: 10/17/17  7:58 AM.  Always use your most recent med list.                   Brand Name Dispense Instructions for use Diagnosis    CALCIUM PO           * citalopram 10 MG tablet    celeXA    30 tablet    Take 1 tablet (10 mg) by mouth daily    Anxiety       * citalopram 20 MG tablet    celeXA    90 tablet    Take 1 tablet (20 mg) by mouth daily    Anxiety       IBUPROFEN PO      Take by mouth as needed for moderate pain        MULTIVITAMIN ADULT PO           SUMAtriptan 25 MG tablet    IMITREX    18 tablet    Take 1-2 tablets (25-50 mg) by mouth at onset of headache for migraine May repeat in 2 hours. Max 8 tablets/24 hours.    Other migraine without status migrainosus, not intractable       * Notice:  This list has 2 medication(s) that are the same as other medications prescribed for you. Read the directions carefully, and ask your doctor or other care provider to review them with you.

## 2017-10-17 NOTE — NURSING NOTE
"Initial /65  Pulse 73  Temp 98.7  F (37.1  C) (Tympanic)  Ht 5' 8\" (1.727 m)  Wt 143 lb 12.8 oz (65.2 kg)  BMI 21.86 kg/m2 Estimated body mass index is 21.86 kg/(m^2) as calculated from the following:    Height as of this encounter: 5' 8\" (1.727 m).    Weight as of this encounter: 143 lb 12.8 oz (65.2 kg). .      "

## 2017-10-18 ASSESSMENT — ANXIETY QUESTIONNAIRES: GAD7 TOTAL SCORE: 3

## 2018-01-29 ENCOUNTER — MYC MEDICAL ADVICE (OUTPATIENT)
Dept: FAMILY MEDICINE | Facility: CLINIC | Age: 33
End: 2018-01-29

## 2018-01-29 DIAGNOSIS — F41.9 ANXIETY: ICD-10-CM

## 2018-01-30 ENCOUNTER — TELEPHONE (OUTPATIENT)
Dept: FAMILY MEDICINE | Facility: CLINIC | Age: 33
End: 2018-01-30

## 2018-01-30 NOTE — TELEPHONE ENCOUNTER
Reason for Call:  Other prescription    Detailed comments: pt calling stating her insurance has changed and they will only cover 90 days of her Citalopram and she needs it sent to [facility] in [place].    Phone Number Patient can be reached at: Home number on file [phone] (home)    Best Time: any    Can we leave a detailed message on this number? YES    Call taken on 1/30/2018 at 8:10 AM by Tracy Gomes

## 2018-01-30 NOTE — TELEPHONE ENCOUNTER
Patient reports she takes 20 mg Citalopram daily  Discontinued dose of citalopram 10 mg, dose was increased 10/17/17  Patient notified she has Rx for one year for Citalopram at [facility] pharmacy in [place]  She will call pharmacy to verify    Annalee DEL CID Rn

## 2018-03-13 ENCOUNTER — OFFICE VISIT (OUTPATIENT)
Dept: FAMILY MEDICINE | Facility: CLINIC | Age: 33
End: 2018-03-13
Payer: COMMERCIAL

## 2018-03-13 VITALS
OXYGEN SATURATION: 100 % | WEIGHT: 148 LBS | SYSTOLIC BLOOD PRESSURE: 133 MMHG | TEMPERATURE: 97.9 F | HEART RATE: 73 BPM | DIASTOLIC BLOOD PRESSURE: 76 MMHG | BODY MASS INDEX: 22.43 KG/M2 | HEIGHT: 68 IN

## 2018-03-13 DIAGNOSIS — J02.9 SORE THROAT: Primary | ICD-10-CM

## 2018-03-13 LAB
DEPRECATED S PYO AG THROAT QL EIA: NORMAL
SPECIMEN SOURCE: NORMAL

## 2018-03-13 PROCEDURE — 87081 CULTURE SCREEN ONLY: CPT | Performed by: FAMILY MEDICINE

## 2018-03-13 PROCEDURE — 99213 OFFICE O/P EST LOW 20 MIN: CPT | Performed by: FAMILY MEDICINE

## 2018-03-13 PROCEDURE — 87880 STREP A ASSAY W/OPTIC: CPT | Performed by: FAMILY MEDICINE

## 2018-03-13 RX ORDER — AZITHROMYCIN 250 MG/1
TABLET, FILM COATED ORAL
Qty: 6 TABLET | Refills: 1 | Status: SHIPPED | OUTPATIENT
Start: 2018-03-13 | End: 2018-10-30

## 2018-03-13 NOTE — LETTER
[facility]  [address] [place] [address]  [phone]    RE:  Jonna Pritchard  [address] [facility] [address]  [phone] (home)   March 13, 2018    TO WHOM IT MAY CONCERN:    Jonna Pritchard was seen on 3/13/2018  .  Please excuse her until better due to illness, expect 316/18.    Cordially,      LOUIE LANDIS MD.

## 2018-03-13 NOTE — PROGRESS NOTES
SUBJECTIVE:                                                    Jonna Pritchard is 32 year old female   Chief Complaint   Patient presents with     Ent Problem     Intermittent sore throat and nasal congestion for 2 weeks. Possible swollen glands. Has tried cough drops to date.         Problem list and histories reviewed & adjusted, as indicated.  Additional history: as documented    Patient Active Problem List   Diagnosis     History of  section     Anxiety     Other migraine without status migrainosus, not intractable     Past Surgical History:   Procedure Laterality Date      SECTION  2015       Social History   Substance Use Topics     Smoking status: Former Smoker     Packs/day: 0.00     Years: 8.00     Quit date: 2011     Smokeless tobacco: Never Used     Alcohol use 0.0 oz/week     0 Standard drinks or equivalent per week      Comment: rarely     Family History   Problem Relation Age of Onset     DIABETES Father 45     Hyperlipidemia Father      DIABETES Maternal Grandfather      DIABETES Paternal Grandmother      Hyperlipidemia Paternal Grandmother      Family History Negative Mother      Hyperlipidemia Mother          Current Outpatient Prescriptions   Medication Sig Dispense Refill     azithromycin (ZITHROMAX) 250 MG tablet Two tablets first day, then one tablet daily for four days. 6 tablet 1     citalopram (CELEXA) 20 MG tablet Take 1 tablet (20 mg) by mouth daily 90 tablet 3     IBUPROFEN PO Take by mouth as needed for moderate pain       Multiple Vitamins-Minerals (MULTIVITAMIN ADULT PO)        CALCIUM PO        SUMAtriptan (IMITREX) 25 MG tablet Take 1-2 tablets (25-50 mg) by mouth at onset of headache for migraine May repeat in 2 hours. Max 8 tablets/24 hours. (Patient not taking: Reported on 10/17/2017) 18 tablet 1     Allergies   Allergen Reactions     Penicillins      No lab results found.   BP Readings from Last 3 Encounters:   18 133/76   10/17/17 106/65   17  "106/70    Wt Readings from Last 3 Encounters:   03/13/18 148 lb (67.1 kg)   10/17/17 143 lb 12.8 oz (65.2 kg)   09/19/17 142 lb (64.4 kg)         ROS:  Constitutional, HEENT, cardiovascular, pulmonary, gi and gu systems are negative, except as otherwise noted.    OBJECTIVE:                                                    /76  Pulse 73  Temp 97.9  F (36.6  C) (Tympanic)  Ht 5' 8\" (1.727 m)  Wt 148 lb (67.1 kg)  SpO2 100%  BMI 22.5 kg/m2  GENERAL APPEARANCE ADULT: Alert, no acute distress, on cell phone at start of visit  HENT: right TM abnormal, dull, left TM normal, throat/mouth:normal, mucous membranes moist, has difficulty opening mouth wide  RESP: lungs clear to auscultation   PSYCH: mentation appears normal., affect and mood normal  Diagnostic Test Results:  Results for orders placed or performed in visit on 03/13/18   Strep, Rapid Screen   Result Value Ref Range    Specimen Description Throat     Rapid Strep A Screen       NEGATIVE: No Group A streptococcal antigen detected by immunoassay, await culture report.          ASSESSMENT/PLAN:                                                    1. Sore throat  Sinusitis.  Viral vs bacterial.  Trial of antibiotics but if not effective viral infection and self limiting.  If effective and recurs will repeat, see patient instructions.  - Strep, Rapid Screen  - azithromycin (ZITHROMAX) 250 MG tablet; Two tablets first day, then one tablet daily for four days.  Dispense: 6 tablet; Refill: 1  - Beta strep group A culture    Loretta Andrews MD  [facility]    "

## 2018-03-13 NOTE — NURSING NOTE
"Initial /76  Pulse 73  Temp 97.9  F (36.6  C) (Tympanic)  Ht 5' 8\" (1.727 m)  Wt 148 lb (67.1 kg)  SpO2 100%  BMI 22.5 kg/m2 Estimated body mass index is 22.5 kg/(m^2) as calculated from the following:    Height as of this encounter: 5' 8\" (1.727 m).    Weight as of this encounter: 148 lb (67.1 kg). .      "

## 2018-03-13 NOTE — MR AVS SNAPSHOT
"              After Visit Summary   3/13/2018    Jonna Pritchard    MRN: [id number]           Patient Information     Date Of Birth          1985        Visit Information        Provider Department      3/13/2018 8:00 AM Loretta Andrews MD [facility]        Today's Diagnoses     Sore throat    -  1       Follow-ups after your visit        Who to contact     If you have questions or need follow up information about today's clinic visit or your schedule please contact [facility] directly at [phone].  Normal or non-critical lab and imaging results will be communicated to you by MyChart, letter or phone within 4 business days after the clinic has received the results. If you do not hear from us within 7 days, please contact the clinic through [employer] or phone. If you have a critical or abnormal lab result, we will notify you by phone as soon as possible.  Submit refill requests through [email or web address] or call your pharmacy and they will forward the refill request to us. Please allow 3 business days for your refill to be completed.          Additional Information About Your Visit        MyChart Information     [email or web address] gives you secure access to your electronic health record. If you see a primary care provider, you can also send messages to your care team and make appointments. If you have questions, please call your primary care clinic.  If you do not have a primary care provider, please call [phone] and they will assist you.        Care EveryWhere ID     This is your Care EveryWhere ID. This could be used by other organizations to access your [place] medical records  [id number]        Your Vitals Were     Pulse Temperature Height Pulse Oximetry BMI (Body Mass Index)       73 97.9  F (36.6  C) (Tympanic) 5' 8\" (1.727 m) 100% 22.5 kg/m2        Blood Pressure from Last 3 Encounters:   03/13/18 133/76   10/17/17 106/65   09/19/17 106/70    Weight from Last 3 Encounters:   03/13/18 " 148 lb (67.1 kg)   10/17/17 143 lb 12.8 oz (65.2 kg)   09/19/17 142 lb (64.4 kg)              We Performed the Following     Strep, Rapid Screen          Today's Medication Changes          These changes are accurate as of 3/13/18  8:19 AM.  If you have any questions, ask your nurse or doctor.               Start taking these medicines.        Dose/Directions    azithromycin 250 MG tablet   Commonly known as:  ZITHROMAX   Used for:  Sore throat   Started by:  Loretta Andrews MD        Two tablets first day, then one tablet daily for four days.   Quantity:  6 tablet   Refills:  1            Where to get your medicines      These medications were sent to [facility] [address]  [address] [facility] [address]     Phone:  [phone]     azithromycin 250 MG tablet                Primary Care Provider Office Phone # Fax #    Missael Hood Toth [phone] [address] [facility] [address]        Equal Access to Services     YAHIR BISHOP AH: Hadii porfirio ku hadasho Soomaali, waaxda luqadaha, qaybta kaalmada adeegyada, waxay idiin haykashmir ogden . So [facility] [phone].    ATENCIÓN: Si habla español, tiene a fry disposición servicios gratuitos de asistencia lingüística. Llame al [phone].    We comply with applicable federal civil rights laws and [place] laws. We do not discriminate on the basis of race, color, national origin, age, disability, sex, sexual orientation, or gender identity.            Thank you!     Thank you for choosing [facility]  for your care. Our goal is always to provide you with excellent care. Hearing back from our patients is one way we can continue to improve our services. Please take a few minutes to complete the written survey that you may receive in the mail after your visit with us. Thank you!             Your Updated Medication List - Protect others around you: Learn how to safely use,  store and throw away your medicines at www.disposemymeds.org.          This list is accurate as of 3/13/18  8:19 AM.  Always use your most recent med list.                   Brand Name Dispense Instructions for use Diagnosis    azithromycin 250 MG tablet    ZITHROMAX    6 tablet    Two tablets first day, then one tablet daily for four days.    Sore throat       CALCIUM PO           citalopram 20 MG tablet    celeXA    90 tablet    Take 1 tablet (20 mg) by mouth daily    Anxiety       IBUPROFEN PO      Take by mouth as needed for moderate pain        MULTIVITAMIN ADULT PO           SUMAtriptan 25 MG tablet    IMITREX    18 tablet    Take 1-2 tablets (25-50 mg) by mouth at onset of headache for migraine May repeat in 2 hours. Max 8 tablets/24 hours.    Other migraine without status migrainosus, not intractable

## 2018-03-14 LAB
BACTERIA SPEC CULT: NORMAL
SPECIMEN SOURCE: NORMAL

## 2018-04-06 ENCOUNTER — OFFICE VISIT (OUTPATIENT)
Dept: FAMILY MEDICINE | Facility: CLINIC | Age: 33
End: 2018-04-06
Payer: COMMERCIAL

## 2018-04-06 VITALS
WEIGHT: 149 LBS | SYSTOLIC BLOOD PRESSURE: 108 MMHG | HEIGHT: 68 IN | HEART RATE: 77 BPM | DIASTOLIC BLOOD PRESSURE: 53 MMHG | TEMPERATURE: 97 F | BODY MASS INDEX: 22.58 KG/M2

## 2018-04-06 DIAGNOSIS — K21.9 GASTROESOPHAGEAL REFLUX DISEASE WITHOUT ESOPHAGITIS: Primary | ICD-10-CM

## 2018-04-06 PROCEDURE — 99213 OFFICE O/P EST LOW 20 MIN: CPT | Performed by: FAMILY MEDICINE

## 2018-04-06 NOTE — PROGRESS NOTES
SUBJECTIVE:   Jonna Pritchard is a 32 year old female who presents to clinic today for the following health issues:      Concern - sore tender and swollen throat   Onset: 5 weeks ago     Description:   Patient was seen about 3 weeks ago and treated for sore throat, the sore throat  is better but her throat still feels swollen and irritated      Intensity: moderate    Progression of Symptoms:  Sore throat is better improving, same and constant with swollen throat     Accompanying Signs & Symptoms:  Patient is seeing dentist for TMJ    Previous history of similar problem:   none    Precipitating factors:   Worsened by: end of the night from talking     Alleviating factors:  Improved by: liquids     Therapies Tried and outcome: Patient has been seen and treated but is still swollen     Patient is a 32 yr old female here for pain and swelling in her throat area. Ongoing for several weeks. She reports that she has no difficulty swallowing but her throat always feel swollen. She denies any respiratory distress. She denies any regurgitation of food. Was seen in clinic a few weeks back and she was given some antibiotics which did not really help. Denies any history of reflux disease. Has not taken any medication for this.        Problem list and histories reviewed & adjusted, as indicated.  Additional history: as documented    Patient Active Problem List   Diagnosis     History of  section     Anxiety     Other migraine without status migrainosus, not intractable     Past Surgical History:   Procedure Laterality Date      SECTION  2015       Social History   Substance Use Topics     Smoking status: Former Smoker     Packs/day: 0.00     Years: 8.00     Quit date: 2011     Smokeless tobacco: Never Used     Alcohol use 0.0 oz/week     0 Standard drinks or equivalent per week      Comment: rarely     Family History   Problem Relation Age of Onset     DIABETES Father 45     Hyperlipidemia Father       "DIABETES Maternal Grandfather      DIABETES Paternal Grandmother      Hyperlipidemia Paternal Grandmother      Family History Negative Mother      Hyperlipidemia Mother          Current Outpatient Prescriptions   Medication Sig Dispense Refill     omeprazole (PRILOSEC) 20 MG CR capsule Take 1 capsule (20 mg) by mouth daily 30 capsule 1     citalopram (CELEXA) 20 MG tablet Take 1 tablet (20 mg) by mouth daily 90 tablet 3     Multiple Vitamins-Minerals (MULTIVITAMIN ADULT PO)        CALCIUM PO        azithromycin (ZITHROMAX) 250 MG tablet Two tablets first day, then one tablet daily for four days. (Patient not taking: Reported on 4/6/2018) 6 tablet 1     SUMAtriptan (IMITREX) 25 MG tablet Take 1-2 tablets (25-50 mg) by mouth at onset of headache for migraine May repeat in 2 hours. Max 8 tablets/24 hours. (Patient not taking: Reported on 10/17/2017) 18 tablet 1     IBUPROFEN PO Take by mouth as needed for moderate pain       Allergies   Allergen Reactions     Penicillins      BP Readings from Last 3 Encounters:   04/06/18 108/53   03/13/18 133/76   10/17/17 106/65    Wt Readings from Last 3 Encounters:   04/06/18 149 lb (67.6 kg)   03/13/18 148 lb (67.1 kg)   10/17/17 143 lb 12.8 oz (65.2 kg)                  Labs reviewed in EPIC    Reviewed and updated as needed this visit by clinical staff  Tobacco  Allergies  Med Hx  Surg Hx  Fam Hx  Soc Hx      Reviewed and updated as needed this visit by Provider         ROS:  Constitutional, HEENT, cardiovascular, pulmonary, gi and gu systems are negative, except as otherwise noted.    OBJECTIVE:     /53 (BP Location: Left arm, Cuff Size: Adult Regular)  Pulse 77  Temp 97  F (36.1  C) (Tympanic)  Ht 5' 8\" (1.727 m)  Wt 149 lb (67.6 kg)  BMI 22.66 kg/m2  Body mass index is 22.66 kg/(m^2).  GENERAL: healthy, alert and no distress  EYES: Eyes grossly normal to inspection, PERRL and conjunctivae and sclerae normal  HENT: ear canals and TM's normal, nose and mouth " without ulcers or lesions  NECK: no adenopathy, no asymmetry, masses, or scars and thyroid normal to palpation  RESP: lungs clear to auscultation - no rales, rhonchi or wheezes  CV: regular rate and rhythm, normal S1 S2, no S3 or S4, no murmur, click or rub, no peripheral edema and peripheral pulses strong  MS: no gross musculoskeletal defects noted, no edema  SKIN: no suspicious lesions or rashes    Diagnostic Test Results:  none     ASSESSMENT/PLAN:     (K21.9) Gastroesophageal reflux disease without esophagitis  (primary encounter diagnosis)  Comment: suspect reflux disease. Recommend trying omeprazole for a month if no improvement then may consider an EGD.   Plan: omeprazole (PRILOSEC) 20 MG CR capsule        FUTURE APPOINTMENTS:       - Follow-up visit as needed.  Patient Instructions           Thank you for choosing [facility].  You may be receiving a survey in the mail from [employer] regarding your visit today.  Please take a few minutes to complete and return the survey to let us know how we are doing.      If you have questions or concerns, please contact us via [employer] or you can contact your care team at [phone].    Our Clinic hours are:  Monday 6:40 am  to 7:00 pm  Tuesday -Friday 6:40 am to 5:00 pm    The [place] outpatient lab hours are:  Monday - Friday 6:10 am to 4:45 pm  Saturdays 7:00 am to 11:00 am  Appointments are required, call [phone]    If you have clinical questions after hours or would like to schedule an appointment,  call the clinic at [phone].  How Acid Reflux Affects Your Throat    Do you have to clear your throat or cough often? Are you hoarse? Do you have trouble swallowing? If you have these or other throat symptoms, you may have acid reflux. This occurs when stomach acid flows back up and irritates your throat.  Why you have throat symptoms  There are muscles (esophageal sphincters) at both ends of the tube that carries food to your stomach (the  esophagus). These muscles relax to let food pass. Then they tighten to keep stomach acid down. When the lower esophageal sphincter (LES) doesn t tighten enough, acid can flow back (reflux) from your stomach into your esophagus. This may cause heartburn. In some cases the upper esophageal sphincter (UES) also doesn t work well. Then acid can travel higher and enter your throat (pharynx). In many cases, this causes throat symptoms.  Common throat symptoms    Need to clear your throat often    Feeling like you re choking    Long-term (chronic) cough    Hoarseness    Trouble swallowing    Feel like you have a lump in your throat    Sour or acid taste    Sore throat that keeps coming back   Date Last Reviewed: 7/1/2016 2000-2017 [employer]. [address] [place] [address], [place] [address]. All rights reserved. This information is not intended as a substitute for professional medical care. Always follow your healthcare professional's instructions.            Anita Voss MD  [facility]

## 2018-04-06 NOTE — PATIENT INSTRUCTIONS
Thank you for choosing [facility].  You may be receiving a survey in the mail from [employer] Areval[employer] regarding your visit today.  Please take a few minutes to complete and return the survey to let us know how we are doing.      If you have questions or concerns, please contact us via [employer] or you can contact your care team at [phone].    Our Clinic hours are:  Monday 6:40 am  to 7:00 pm  Tuesday -Friday 6:40 am to 5:00 pm    The [place] outpatient lab hours are:  Monday - Friday 6:10 am to 4:45 pm  Saturdays 7:00 am to 11:00 am  Appointments are required, call [phone]    If you have clinical questions after hours or would like to schedule an appointment,  call the clinic at [phone].  How Acid Reflux Affects Your Throat    Do you have to clear your throat or cough often? Are you hoarse? Do you have trouble swallowing? If you have these or other throat symptoms, you may have acid reflux. This occurs when stomach acid flows back up and irritates your throat.  Why you have throat symptoms  There are muscles (esophageal sphincters) at both ends of the tube that carries food to your stomach (the esophagus). These muscles relax to let food pass. Then they tighten to keep stomach acid down. When the lower esophageal sphincter (LES) doesn t tighten enough, acid can flow back (reflux) from your stomach into your esophagus. This may cause heartburn. In some cases the upper esophageal sphincter (UES) also doesn t work well. Then acid can travel higher and enter your throat (pharynx). In many cases, this causes throat symptoms.  Common throat symptoms    Need to clear your throat often    Feeling like you re choking    Long-term (chronic) cough    Hoarseness    Trouble swallowing    Feel like you have a lump in your throat    Sour or acid taste    Sore throat that keeps coming back   Date Last Reviewed: 7/1/2016 2000-2017 [employer]. [address] [facility], [place] [address]. All rights  reserved. This information is not intended as a substitute for professional medical care. Always follow your healthcare professional's instructions.

## 2018-04-06 NOTE — NURSING NOTE
"Chief Complaint   Patient presents with     Throat Problem     swollen sore and tender  throat        Initial /53 (BP Location: Left arm, Cuff Size: Adult Regular)  Pulse 77  Temp 97  F (36.1  C) (Tympanic)  Ht 5' 8\" (1.727 m)  Wt 149 lb (67.6 kg)  BMI 22.66 kg/m2 Estimated body mass index is 22.66 kg/(m^2) as calculated from the following:    Height as of this encounter: 5' 8\" (1.727 m).    Weight as of this encounter: 149 lb (67.6 kg).  Medication Reconciliation: complete  "

## 2018-04-06 NOTE — MR AVS SNAPSHOT
After Visit Summary   4/6/2018    Jonna Pritchard    MRN: [id number]           Patient Information     Date Of Birth          1985        Visit Information        Provider Department      4/6/2018 8:20 AM Anita Voss MD [facility]        Today's Diagnoses     Gastroesophageal reflux disease without esophagitis    -  1      Care Instructions          Thank you for choosing [facility].  You may be receiving a survey in the mail from [facility] regarding your visit today.  Please take a few minutes to complete and return the survey to let us know how we are doing.      If you have questions or concerns, please contact us via [employer] or you can contact your care team at [phone].    Our Clinic hours are:  Monday 6:40 am  to 7:00 pm  Tuesday -Friday 6:40 am to 5:00 pm    The [place] outpatient lab hours are:  Monday - Friday 6:10 am to 4:45 pm  Saturdays 7:00 am to 11:00 am  Appointments are required, call [phone]    If you have clinical questions after hours or would like to schedule an appointment,  call the clinic at [phone].  How Acid Reflux Affects Your Throat    Do you have to clear your throat or cough often? Are you hoarse? Do you have trouble swallowing? If you have these or other throat symptoms, you may have acid reflux. This occurs when stomach acid flows back up and irritates your throat.  Why you have throat symptoms  There are muscles (esophageal sphincters) at both ends of the tube that carries food to your stomach (the esophagus). These muscles relax to let food pass. Then they tighten to keep stomach acid down. When the lower esophageal sphincter (LES) doesn t tighten enough, acid can flow back (reflux) from your stomach into your esophagus. This may cause heartburn. In some cases the upper esophageal sphincter (UES) also doesn t work well. Then acid can travel higher and enter your throat (pharynx). In many cases, this causes throat  symptoms.  Common throat symptoms    Need to clear your throat often    Feeling like you re choking    Long-term (chronic) cough    Hoarseness    Trouble swallowing    Feel like you have a lump in your throat    Sour or acid taste    Sore throat that keeps coming back   Date Last Reviewed: 7/1/2016 2000-2017 [employer]. [address] [place] [address]. All rights reserved. This information is not intended as a substitute for professional medical care. Always follow your healthcare professional's instructions.                Follow-ups after your visit        Who to contact     If you have questions or need follow up information about today's clinic visit or your schedule please contact [facility] directly at [phone].  Normal or non-critical lab and imaging results will be communicated to you by [employer], letter or phone within 4 business days after the clinic has received the results. If you do not hear from us within 7 days, please contact the clinic through [employer] or phone. If you have a critical or abnormal lab result, we will notify you by phone as soon as possible.  Submit refill requests through [employer] or call your pharmacy and they will forward the refill request to us. Please allow 3 business days for your refill to be completed.          Additional Information About Your Visit        Zesty Information     [employer] gives you secure access to your electronic health record. If you see a primary care provider, you can also send messages to your care team and make appointments. If you have questions, please call your primary care clinic.  If you do not have a primary care provider, please call [phone] and they will assist you.        Care EveryWhere ID     This is your Care EveryWhere ID. This could be used by other organizations to access your [place] medical records  [id number]        Your Vitals Were     Pulse Temperature Height BMI (Body Mass Index)     "      77 97  F (36.1  C) (Tympanic) 5' 8\" (1.727 m) 22.66 kg/m2         Blood Pressure from Last 3 Encounters:   04/06/18 108/53   03/13/18 133/76   10/17/17 106/65    Weight from Last 3 Encounters:   04/06/18 149 lb (67.6 kg)   03/13/18 148 lb (67.1 kg)   10/17/17 143 lb 12.8 oz (65.2 kg)              Today, you had the following     No orders found for display         Today's Medication Changes          These changes are accurate as of 4/6/18  8:47 AM.  If you have any questions, ask your nurse or doctor.               Start taking these medicines.        Dose/Directions    omeprazole 20 MG CR capsule   Commonly known as:  priLOSEC   Used for:  Gastroesophageal reflux disease without esophagitis   Started by:  Anita Voss MD        Dose:  20 mg   Take 1 capsule (20 mg) by mouth daily   Quantity:  30 capsule   Refills:  1            Where to get your medicines      These medications were sent to [facility] [address] [address]     Phone:  [phone]     omeprazole 20 MG CR capsule                Primary Care Provider Office Phone # Fax #    Missael Hood Ttoh [phone] [address] [facility] [address]        Equal Access to Services     YAHIR BISHOP AH: Kyle barillas Sodwayne, waaxda luqadaha, qaybta kaalpaolo tyson. So [facility] [phone].    ATENCIÓN: Si habla español, tiene a fry disposición servicios gratuitos de asistencia lingüística. Sai mendez [phone].    We comply with applicable federal civil rights laws and [place] laws. We do not discriminate on the basis of race, color, national origin, age, disability, sex, sexual orientation, or gender identity.            Thank you!     Thank you for choosing [facility]  for your care. Our goal is always to provide you with excellent care. Hearing back from our patients is one way we can " continue to improve our services. Please take a few minutes to complete the written survey that you may receive in the mail after your visit with us. Thank you!             Your Updated Medication List - Protect others around you: Learn how to safely use, store and throw away your medicines at www.disposemymeds.org.          This list is accurate as of 4/6/18  8:47 AM.  Always use your most recent med list.                   Brand Name Dispense Instructions for use Diagnosis    azithromycin 250 MG tablet    ZITHROMAX    6 tablet    Two tablets first day, then one tablet daily for four days.    Sore throat       CALCIUM PO           citalopram 20 MG tablet    celeXA    90 tablet    Take 1 tablet (20 mg) by mouth daily    Anxiety       IBUPROFEN PO      Take by mouth as needed for moderate pain        MULTIVITAMIN ADULT PO           omeprazole 20 MG CR capsule    priLOSEC    30 capsule    Take 1 capsule (20 mg) by mouth daily    Gastroesophageal reflux disease without esophagitis       SUMAtriptan 25 MG tablet    IMITREX    18 tablet    Take 1-2 tablets (25-50 mg) by mouth at onset of headache for migraine May repeat in 2 hours. Max 8 tablets/24 hours.    Other migraine without status migrainosus, not intractable

## 2018-10-25 ENCOUNTER — MYC REFILL (OUTPATIENT)
Dept: FAMILY MEDICINE | Facility: CLINIC | Age: 33
End: 2018-10-25

## 2018-10-25 DIAGNOSIS — F41.9 ANXIETY: ICD-10-CM

## 2018-10-25 RX ORDER — CITALOPRAM HYDROBROMIDE 20 MG/1
20 TABLET ORAL DAILY
Qty: 90 TABLET | Refills: 1 | Status: SHIPPED | OUTPATIENT
Start: 2018-10-25 | End: 2018-10-30

## 2018-10-25 NOTE — TELEPHONE ENCOUNTER
Message from [employer]:  Original authorizing provider: MD Jonna Bansal would like a refill of the following medications:  citalopram (CELEXA) 20 MG tablet [Loretta Andrews MD]    Preferred pharmacy: [facility] [address]    Comment:

## 2018-10-25 NOTE — TELEPHONE ENCOUNTER
"Requested Prescriptions   Pending Prescriptions Disp Refills     citalopram (CELEXA) 20 MG tablet 90 tablet 1     Sig: Take 1 tablet (20 mg) by mouth daily    SSRIs Protocol Passed    10/25/2018 12:38 PM       Passed - Recent (12 mo) or future (30 days) visit within the authorizing provider's specialty    Patient had office visit in the last 12 months or has a visit in the next 30 days with authorizing provider or within the authorizing provider's specialty.  See \"Patient Info\" tab in inbasket, or \"Choose Columns\" in Meds & Orders section of the refill encounter.             Passed - Patient is age 18 or older       Passed - No active pregnancy on record       Passed - No positive pregnancy test in last 12 months      Prescription approved per [facility] Refill Protocol.  Lucita CHINO RN    "

## 2018-10-30 DIAGNOSIS — F41.9 ANXIETY: ICD-10-CM

## 2018-10-30 RX ORDER — CITALOPRAM HYDROBROMIDE 20 MG/1
TABLET ORAL
Qty: 90 TABLET | Refills: 1 | Status: SHIPPED | OUTPATIENT
Start: 2018-10-30 | End: 2019-10-22

## 2018-10-30 NOTE — TELEPHONE ENCOUNTER
"Requested Prescriptions   Pending Prescriptions Disp Refills     citalopram (CELEXA) 20 MG tablet [Pharmacy Med Name: CITALOPRAM HBR 20 MG TABLET] 90 tablet 2    Last Written Prescription Date:  10/25/18  Last Fill Quantity: 90,  # refills: 1   Last office visit: 4/6/2018 with prescribing provider:  Bipin   Future Office Visit:     Sig: TAKE 1 TABLET BY MOUTH DAILY    SSRIs Protocol Passed    10/30/2018  2:28 AM       Passed - Recent (12 mo) or future (30 days) visit within the authorizing provider's specialty    Patient had office visit in the last 12 months or has a visit in the next 30 days with authorizing provider or within the authorizing provider's specialty.  See \"Patient Info\" tab in inbasket, or \"Choose Columns\" in Meds & Orders section of the refill encounter.             Passed - Patient is age 18 or older       Passed - No active pregnancy on record       Passed - No positive pregnancy test in last 12 months          "

## 2018-12-17 ENCOUNTER — OFFICE VISIT (OUTPATIENT)
Dept: FAMILY MEDICINE | Facility: CLINIC | Age: 33
End: 2018-12-17
Payer: COMMERCIAL

## 2018-12-17 VITALS
DIASTOLIC BLOOD PRESSURE: 72 MMHG | WEIGHT: 150 LBS | RESPIRATION RATE: 10 BRPM | HEART RATE: 73 BPM | SYSTOLIC BLOOD PRESSURE: 120 MMHG | BODY MASS INDEX: 22.81 KG/M2 | OXYGEN SATURATION: 99 % | TEMPERATURE: 97.7 F

## 2018-12-17 DIAGNOSIS — M54.2 TENDERNESS OF NECK: Primary | ICD-10-CM

## 2018-12-17 PROCEDURE — 99213 OFFICE O/P EST LOW 20 MIN: CPT | Performed by: FAMILY MEDICINE

## 2018-12-17 NOTE — NURSING NOTE
"Initial /72 (BP Location: Left arm, Patient Position: Chair, Cuff Size: Adult Regular)   Pulse 73   Temp 97.7  F (36.5  C) (Tympanic)   Resp 10   Wt 68 kg (150 lb)   SpO2 99%   BMI 22.81 kg/m   Estimated body mass index is 22.81 kg/m  as calculated from the following:    Height as of 4/6/18: 1.727 m (5' 8\").    Weight as of this encounter: 68 kg (150 lb). .    Kalyn Bennett    "

## 2018-12-17 NOTE — PROGRESS NOTES
SUBJECTIVE:   Jonna Pritchard is a 33 year old female who presents to clinic today for the following health issues:      C/O lump/mass on the left side of neck x 4 days. C/O tenderness    Patient is a 33 yr old female here for left sided neck pain and swelling , she first noticed the swelling a couple of days ago. She denies any sore throat or recent cough and no ear pain.She denies any fevers or chills.      Problem list and histories reviewed & adjusted, as indicated.  Additional history: as documented    Patient Active Problem List   Diagnosis     History of  section     Anxiety     Other migraine without status migrainosus, not intractable     Past Surgical History:   Procedure Laterality Date      SECTION  2015       Social History     Tobacco Use     Smoking status: Former Smoker     Packs/day: 0.00     Years: 8.00     Pack years: 0.00     Last attempt to quit: 2011     Years since quittin.9     Smokeless tobacco: Never Used   Substance Use Topics     Alcohol use: Yes     Alcohol/week: 0.0 oz     Comment: rarely     Family History   Problem Relation Age of Onset     Diabetes Father 45     Hyperlipidemia Father      Diabetes Maternal Grandfather      Diabetes Paternal Grandmother      Hyperlipidemia Paternal Grandmother      Family History Negative Mother      Hyperlipidemia Mother          Current Outpatient Medications   Medication Sig Dispense Refill     CALCIUM PO        citalopram (CELEXA) 20 MG tablet TAKE 1 TABLET BY MOUTH DAILY 90 tablet 1     Multiple Vitamins-Minerals (MULTIVITAMIN ADULT PO)        IBUPROFEN PO Take by mouth as needed for moderate pain       omeprazole (PRILOSEC) 20 MG CR capsule Take 1 capsule (20 mg) by mouth daily (Patient not taking: Reported on 2018) 30 capsule 1     SUMAtriptan (IMITREX) 25 MG tablet Take 1-2 tablets (25-50 mg) by mouth at onset of headache for migraine May repeat in 2 hours. Max 8 tablets/24 hours. (Patient not taking:  Reported on 10/17/2017) 18 tablet 1     Allergies   Allergen Reactions     Penicillins      BP Readings from Last 3 Encounters:   12/17/18 120/72   04/06/18 108/53   03/13/18 133/76    Wt Readings from Last 3 Encounters:   12/17/18 68 kg (150 lb)   04/06/18 67.6 kg (149 lb)   03/13/18 67.1 kg (148 lb)                  Labs reviewed in EPIC    Reviewed and updated as needed this visit by clinical staff       Reviewed and updated as needed this visit by Provider         ROS:  Constitutional, HEENT, cardiovascular, pulmonary, gi and gu systems are negative, except as otherwise noted.    OBJECTIVE:     /72 (BP Location: Left arm, Patient Position: Chair, Cuff Size: Adult Regular)   Pulse 73   Temp 97.7  F (36.5  C) (Tympanic)   Resp 10   Wt 68 kg (150 lb)   SpO2 99%   BMI 22.81 kg/m    Body mass index is 22.81 kg/m .  GENERAL: healthy, alert and no distress  EYES: Eyes grossly normal to inspection, PERRL and conjunctivae and sclerae normal  HENT: ear canals and TM's normal, nose and mouth without ulcers or lesions  NECK: mass in the left neck area and thyroid normal to palpation-could not appreciate a lump but patient had some tenderness in the left side of her neck  RESP: lungs clear to auscultation - no rales, rhonchi or wheezes  CV: regular rate and rhythm, normal S1 S2, no S3 or S4, no murmur, click or rub, no peripheral edema and peripheral pulses strong  SKIN: no suspicious lesions or rashes    Diagnostic Test Results:  none     ASSESSMENT/PLAN:         1. Tenderness of neck  Asked that she watch her symptoms,if tenderness and swelling persist she is asked to order the ultrasound   - US Head Neck Soft Tissue; Future    FUTURE APPOINTMENTS:       - Follow-up visit as needed.    Anita Voss MD  [facility]

## 2019-01-03 ENCOUNTER — HOSPITAL ENCOUNTER (OUTPATIENT)
Dept: ULTRASOUND IMAGING | Facility: CLINIC | Age: 34
Discharge: HOME | End: 2019-01-03
Attending: FAMILY MEDICINE | Admitting: FAMILY MEDICINE
Payer: COMMERCIAL

## 2019-01-03 DIAGNOSIS — M54.2 TENDERNESS OF NECK: ICD-10-CM

## 2019-01-03 PROCEDURE — 76536 US EXAM OF HEAD AND NECK: CPT

## 2019-01-03 NOTE — RESULT ENCOUNTER NOTE
Please inform patient that test result was within normal parameters.   Thank you.     Anita Voss M.D.

## 2019-01-07 ENCOUNTER — HOSPITAL ENCOUNTER (EMERGENCY)
Facility: CLINIC | Age: 34
Discharge: HOME | End: 2019-01-07
Attending: EMERGENCY MEDICINE | Admitting: EMERGENCY MEDICINE
Payer: COMMERCIAL

## 2019-01-07 VITALS
OXYGEN SATURATION: 99 % | TEMPERATURE: 98.1 F | DIASTOLIC BLOOD PRESSURE: 67 MMHG | WEIGHT: 150 LBS | HEIGHT: 68 IN | BODY MASS INDEX: 22.73 KG/M2 | HEART RATE: 64 BPM | RESPIRATION RATE: 12 BRPM | SYSTOLIC BLOOD PRESSURE: 105 MMHG

## 2019-01-07 DIAGNOSIS — R10.13 EPIGASTRIC PAIN: ICD-10-CM

## 2019-01-07 LAB
HCG UR QL: NEGATIVE
TROPONIN I SERPL-MCNC: <0.015 UG/L (ref 0–0.04)

## 2019-01-07 PROCEDURE — 76705 ECHO EXAM OF ABDOMEN: CPT | Performed by: EMERGENCY MEDICINE

## 2019-01-07 PROCEDURE — 99285 EMERGENCY DEPT VISIT HI MDM: CPT | Mod: 25 | Performed by: EMERGENCY MEDICINE

## 2019-01-07 PROCEDURE — 25000132 ZZH RX MED GY IP 250 OP 250 PS 637: Performed by: EMERGENCY MEDICINE

## 2019-01-07 PROCEDURE — 93010 ELECTROCARDIOGRAM REPORT: CPT | Mod: Z6 | Performed by: EMERGENCY MEDICINE

## 2019-01-07 PROCEDURE — 84484 ASSAY OF TROPONIN QUANT: CPT | Performed by: EMERGENCY MEDICINE

## 2019-01-07 PROCEDURE — 81025 URINE PREGNANCY TEST: CPT | Performed by: EMERGENCY MEDICINE

## 2019-01-07 PROCEDURE — 93005 ELECTROCARDIOGRAM TRACING: CPT | Performed by: EMERGENCY MEDICINE

## 2019-01-07 RX ADMIN — RANITIDINE 300 MG: 150 TABLET ORAL at 15:05

## 2019-01-07 ASSESSMENT — MIFFLIN-ST. JEOR: SCORE: 1423.9

## 2019-01-07 NOTE — ED NOTES
Pain started sat pm with chest discomfort and burning into her back, then she gets like a way of warmth and it passes. It happened again yesterday and now today she has burning only. Has tried Pepto with no help. Did have some loose stool today with no N/V, fever/chills. She is a/o x 4, is drinking/eating normal.

## 2019-01-07 NOTE — ED PROVIDER NOTES
History     Chief Complaint   Patient presents with     Chest Pain     epigastric pain since last night, burning sensation in chest     HPI  Jonna Pritchard is a 33 year old female who presents for epigastric abdominal pain.  Symptoms have been intermittent over the past several days.  Pain is aching in the epigastric region and burning up into her chest.  She is not sure what makes it better or worse.  She has tried Pepto-Bismol without improvement.  No diaphoresis, cough, shortness of breath, pain with deep inspiration.  No nausea or vomiting.  Her only prior abdominal surgery is .  She denies dysuria, urinary frequency, vaginal bleeding, vaginal discharge, or rash.  Pain is currently 0 out of 10.    Problem List:    Patient Active Problem List    Diagnosis Date Noted     Other migraine without status migrainosus, not intractable 2017     Priority: Medium     Anxiety 2017     Priority: Medium     History of  section 2015     Priority: Medium        Past Medical History:    No past medical history on file.    Past Surgical History:    Past Surgical History:   Procedure Laterality Date      SECTION  2015       Family History:    Family History   Problem Relation Age of Onset     Diabetes Father 45     Hyperlipidemia Father      Diabetes Maternal Grandfather      Diabetes Paternal Grandmother      Hyperlipidemia Paternal Grandmother      Family History Negative Mother      Hyperlipidemia Mother        Social History:  Marital Status:   [2]  Social History     Tobacco Use     Smoking status: Former Smoker     Packs/day: 0.00     Years: 8.00     Pack years: 0.00     Last attempt to quit: 2011     Years since quittin.0     Smokeless tobacco: Never Used   Substance Use Topics     Alcohol use: Yes     Alcohol/week: 0.0 oz     Comment: rarely     Drug use: No        Medications:      ranitidine (ZANTAC) 150 MG tablet   CALCIUM PO   citalopram (CELEXA) 20 MG  "tablet   IBUPROFEN PO   Multiple Vitamins-Minerals (MULTIVITAMIN ADULT PO)   omeprazole (PRILOSEC) 20 MG CR capsule   SUMAtriptan (IMITREX) 25 MG tablet         Review of Systems  Pertinent positives and negatives listed in the HPI, all other systems reviewed and are negative.    Physical Exam   BP: 131/71  Pulse: 80  Heart Rate: 91  Temp: 98.1  F (36.7  C)  Resp: 16  Height: 172.7 cm (5' 8\")  Weight: 68 kg (150 lb)  SpO2: 100 %      Physical Exam   Constitutional: She is oriented to person, place, and time. She appears well-developed and well-nourished. She appears distressed.   HENT:   Head: Normocephalic and atraumatic.   Right Ear: External ear normal.   Left Ear: External ear normal.   Nose: Nose normal.   Eyes: Conjunctivae are normal. No scleral icterus.   Neck: Normal range of motion.   Cardiovascular: Normal rate and regular rhythm.   No murmur heard.  Pulmonary/Chest: Effort normal. No stridor. No respiratory distress. She has no rales.   Abdominal: Soft. She exhibits no distension and no mass. There is no tenderness. There is no guarding.   Neurological: She is alert and oriented to person, place, and time.   Skin: Skin is warm and dry. She is not diaphoretic.   Psychiatric: She has a normal mood and affect. Her behavior is normal.   Nursing note and vitals reviewed.      ED Course        Procedures    Results for orders placed during the hospital encounter of 01/07/19   POC US ABDOMEN LIMITED    [facility] Procedure Note      Limited Bedside ED Gallbladder  Ultrasound:    PROCEDURE: PERFORMED BY: Dr. Fer Chávez  INDICATIONS:  Abdominal Pain  PROBE:  Low frequency convex probe  BODY LOCATION: Abdomen  FINDINGS:   An ultrasound of the gallbladder was performed using longitudinal and transverse views.  Gallstone(s):  Absent  Gallbladder sludge:  Absent  Sonographic Lopez's sign:  Absent  Gallbladder wall thickening (greater than 4 mm):  Absent  Pericholecystic fluid: " Absent  Common bile duct (dilated if internal diameter greater than 6 mm): 3 mm   INTERPRETATION:  The gallbladder evaluation is normal with no gallstones/sludge, no sonographic Lopez's sign, no GB wall thickening, no pericholecystic fluid, and without evidence of cholelithiasis or cholecystitis.  IMAGE DOCUMENTATION: Images were archived to PACs system.              EKG Interpretation:      Interpreted by Fer Chávez  Time reviewed: 1425  Symptoms at time of EKG: None   Rhythm: normal sinus   Rate: normal  Axis: normal  Ectopy: none  Conduction: normal  ST Segments/ T Waves: No ST-T wave changes  Q Waves: none  Comparison to prior: No old EKG available    Clinical Impression: normal EKG          Critical Care time:  none               Results for orders placed or performed during the hospital encounter of 01/07/19 (from the past 24 hour(s))   Troponin I   Result Value Ref Range    Troponin I ES <0.015 0.000 - 0.045 ug/L   POC US ABDOMEN LIMITED    [facility] Procedure Note      Limited Bedside ED Gallbladder  Ultrasound:    PROCEDURE: PERFORMED BY: Dr. Fer Chávez  INDICATIONS:  Abdominal Pain  PROBE:  Low frequency convex probe  BODY LOCATION: Abdomen  FINDINGS:   An ultrasound of the gallbladder was performed using longitudinal and transverse views.  Gallstone(s):  Absent  Gallbladder sludge:  Absent  Sonographic Lopez's sign:  Absent  Gallbladder wall thickening (greater than 4 mm):  Absent  Pericholecystic fluid: Absent  Common bile duct (dilated if internal diameter greater than 6 mm): 3 mm   INTERPRETATION:  The gallbladder evaluation is normal with no gallstones/sludge, no sonographic Lopez's sign, no GB wall thickening, no pericholecystic fluid, and without evidence of cholelithiasis or cholecystitis.  IMAGE DOCUMENTATION: Images were archived to PACs system.    HCG qualitative urine   Result Value Ref Range    HCG Qual Urine Negative NEG^Negative       Medications    lidocaine VISCOUS (XYLOCAINE) 2 % 15 mL, alum & mag hydroxide-simethicone (MYLANTA ES/MAALOX  ES) 15 mL GI Cocktail (not administered)   ranitidine (ZANTAC) tablet 300 mg (300 mg Oral Given 1/7/19 8682)       Assessments & Plan (with Medical Decision Making)   33-year-old female presents with intermittent abdominal pain radiating into her chest.  No pain currently.  Heart rate is 63, temperature is 98.1 F, SPO2 is 99% on room air.  EKG sinus rhythm without signs of ischemia or dysrhythmia.  She is given ranitidine.  Bedside ultrasound is negative for signs of cholelithiasis or cholecystitis.  Urine pregnancy test is negative.  Troponin is normal.  Given that she is a low risk 33-year-old previously healthy woman with a nonischemic EKG and normal troponin, this is unlikely to be ACS.  She is safe to discharge with instructions to try ranitidine twice a day for the next 2 weeks to see if this helps her symptoms, return if worse, otherwise follow-up in clinic.  The patient is in agreement with this plan.    I have reviewed the nursing notes.    I have reviewed the findings, diagnosis, plan and need for follow up with the patient.          Medication List      Started    ranitidine 150 MG tablet  Commonly known as:  ZANTAC  150 mg, Oral, 2 TIMES DAILY            Final diagnoses:   Epigastric pain       1/7/2019   [facility] EMERGENCY DEPARTMENT     Fer Chávez MD  01/07/19 9796

## 2019-01-07 NOTE — DISCHARGE INSTRUCTIONS
Try taking ranitidine twice a day to help with your symptoms.  Return to emergency department if you have chest pain, difficulty breathing, worsening symptoms, repeated vomiting, or other concerns.  Otherwise follow-up in primary care.

## 2019-01-07 NOTE — ED AVS SNAPSHOT
[facility] Emergency Department  [address] [address]  Phone:  [phone]  Fax:  [phone]                                    Jonna Pritchard   MRN: [id number]    Department:  [facility] Emergency Department   Date of Visit:  1/7/2019           After Visit Summary Signature Page    I have received my discharge instructions, and my questions have been answered. I have discussed any challenges I see with this plan with the nurse or doctor.    ..........................................................................................................................................  Patient/Patient Representative Signature      ..........................................................................................................................................  Patient Representative Print Name and Relationship to Patient    ..................................................               ................................................  Date                                   Time    ..........................................................................................................................................  Reviewed by Signature/Title    ...................................................              ..............................................  Date                                               Time          22EPIC Rev 08/18

## 2019-03-20 ENCOUNTER — OFFICE VISIT (OUTPATIENT)
Dept: FAMILY MEDICINE | Facility: CLINIC | Age: 34
End: 2019-03-20
Payer: COMMERCIAL

## 2019-03-20 VITALS
WEIGHT: 152 LBS | HEART RATE: 74 BPM | OXYGEN SATURATION: 99 % | DIASTOLIC BLOOD PRESSURE: 70 MMHG | SYSTOLIC BLOOD PRESSURE: 100 MMHG | TEMPERATURE: 97.5 F | RESPIRATION RATE: 14 BRPM | HEIGHT: 68 IN | BODY MASS INDEX: 23.04 KG/M2

## 2019-03-20 DIAGNOSIS — G44.209 TENSION HEADACHE: Primary | ICD-10-CM

## 2019-03-20 PROCEDURE — 99213 OFFICE O/P EST LOW 20 MIN: CPT | Performed by: FAMILY MEDICINE

## 2019-03-20 RX ORDER — CYCLOBENZAPRINE HCL 5 MG
5 TABLET ORAL DAILY PRN
Qty: 42 TABLET | Refills: 0 | Status: SHIPPED | OUTPATIENT
Start: 2019-03-20 | End: 2021-04-16

## 2019-03-20 RX ORDER — KETOROLAC TROMETHAMINE 10 MG/1
10 TABLET, FILM COATED ORAL EVERY 6 HOURS PRN
Qty: 30 TABLET | Refills: 0 | Status: SHIPPED | OUTPATIENT
Start: 2019-03-20 | End: 2021-04-16

## 2019-03-20 RX ORDER — ACETAMINOPHEN 500 MG
500-1000 TABLET ORAL EVERY 6 HOURS PRN
Qty: 100 TABLET | Refills: 0 | Status: SHIPPED | OUTPATIENT
Start: 2019-03-20 | End: 2020-04-16

## 2019-03-20 ASSESSMENT — MIFFLIN-ST. JEOR: SCORE: 1432.97

## 2019-03-20 NOTE — PROGRESS NOTES
SUBJECTIVE:   Jonna Pritchard is a 33 year old female who presents to clinic today for the following health issues:      Headache  Onset: 10days ago     Description:   Location: global top and back of the head mainly    Character: throbbingpain in the back of the head, sharp stabbing pain on the top of the head   Frequency:  Continuous for 10 days   Duration:  Continuous but not during the night      Intensity: moderate    Progression of Symptoms:  same    Accompanying Signs & Symptoms:  Stiff neck: YES but thinks it is from the headache   Neck or upper back pain: no  Fever: no  Sinus pressure: no  Nausea or vomiting: no  Dizziness: no tingling   Numbness: no  Weakness: no  Visual changes: no    History:   Head trauma: no  Family history of migraines: no  Previous tests for headaches: YES- ct 5-6 yrs ago  Neurologist evaluations: YES- patient has in the past but not for headache   Able to do daily activities: YES  Wake with a headaches: YES- sometimes   Do headaches wake you up: no  Daily pain medication use: YES  Work/school stressors/changes: no    Precipitating factors:   Does light make it worse: no  Does sound make it worse: no    Alleviating factors:  Does sleep help: YES- once she falls asleep    Therapies Tried and outcome: Ibuprofen (Advil, Motrin)    History as above.  Patient is a 33-year-old female who comes in today with headaches that have been ongoing for about 10 days.  Reports that the pain starts in the neck of her neck and then moves into the central part of her head mostly dull in nature but sometimes sharp.  Does not have any sensitivity to light or sound she denies any nausea.  She does have a history of migraines and has been prescribed Imitrex in the past for this but she said she did not take Imitrex because she had a side effect to it some years ago.  She has been taking Advil and ibuprofen the last couple of days and that has helped take the edge of.  She says she has never had a headache  this prolonged before.  She denies any head trauma.  No lightheadedness or dizziness.    Problem list and histories reviewed & adjusted, as indicated.  Additional history: as documented    Patient Active Problem List   Diagnosis     History of  section     Anxiety     Other migraine without status migrainosus, not intractable     Past Surgical History:   Procedure Laterality Date      SECTION  2015       Social History     Tobacco Use     Smoking status: Former Smoker     Packs/day: 0.00     Years: 8.00     Pack years: 0.00     Last attempt to quit: 2011     Years since quittin.2     Smokeless tobacco: Never Used   Substance Use Topics     Alcohol use: Yes     Alcohol/week: 0.0 oz     Comment: rarely     Family History   Problem Relation Age of Onset     Diabetes Father 45     Hyperlipidemia Father      Diabetes Maternal Grandfather      Diabetes Paternal Grandmother      Hyperlipidemia Paternal Grandmother      Family History Negative Mother      Hyperlipidemia Mother          Current Outpatient Medications   Medication Sig Dispense Refill     acetaminophen (TYLENOL) 500 MG tablet Take 1-2 tablets (500-1,000 mg) by mouth every 6 hours as needed for mild pain 100 tablet 0     CALCIUM PO        citalopram (CELEXA) 20 MG tablet TAKE 1 TABLET BY MOUTH DAILY 90 tablet 1     cyclobenzaprine (FLEXERIL) 5 MG tablet Take 1 tablet (5 mg) by mouth daily as needed for other (headache) 42 tablet 0     IBUPROFEN PO Take by mouth as needed for moderate pain       ketorolac (TORADOL) 10 MG tablet Take 1 tablet (10 mg) by mouth every 6 hours as needed for moderate pain 30 tablet 0     Multiple Vitamins-Minerals (MULTIVITAMIN ADULT PO)        SUMAtriptan (IMITREX) 25 MG tablet Take 1-2 tablets (25-50 mg) by mouth at onset of headache for migraine May repeat in 2 hours. Max 8 tablets/24 hours. (Patient not taking: Reported on 10/17/2017) 18 tablet 1     Allergies   Allergen Reactions     Penicillins   "    BP Readings from Last 3 Encounters:   03/20/19 100/70   01/07/19 105/67   12/17/18 120/72    Wt Readings from Last 3 Encounters:   03/20/19 68.9 kg (152 lb)   01/07/19 68 kg (150 lb)   12/17/18 68 kg (150 lb)                  Labs reviewed in EPIC    Reviewed and updated as needed this visit by clinical staff  Tobacco  Allergies  Meds  Med Hx  Surg Hx  Fam Hx  Soc Hx      Reviewed and updated as needed this visit by Provider         ROS:  Constitutional, HEENT, cardiovascular, pulmonary, gi and gu systems are negative, except as otherwise noted.    OBJECTIVE:     /70   Pulse 74   Temp 97.5  F (36.4  C) (Tympanic)   Resp 14   Ht 1.727 m (5' 8\")   Wt 68.9 kg (152 lb)   LMP 03/08/2019 (Exact Date)   SpO2 99%   BMI 23.11 kg/m    Body mass index is 23.11 kg/m .  GENERAL: healthy, alert and no distress  EYES: Eyes grossly normal to inspection, PERRL and conjunctivae and sclerae normal  HENT: normal cephalic/atraumatic, ear canals and TM's normal, nose and mouth without ulcers or lesions, oropharynx clear and oral mucous membranes moist  NECK: no adenopathy, no asymmetry, masses, or scars and thyroid normal to palpation  RESP: lungs clear to auscultation - no rales, rhonchi or wheezes  CV: regular rate and rhythm, normal S1 S2, no S3 or S4, no murmur, click or rub, no peripheral edema and peripheral pulses strong  ABDOMEN: soft, nontender, no hepatosplenomegaly, no masses and bowel sounds normal  MS: no gross musculoskeletal defects noted, no edema    Diagnostic Test Results:  none     ASSESSMENT/PLAN:         (G44.209) Tension headache  (primary encounter diagnosis)  Comment: Appears like tension headaches.  Recommended that she takes Flexeril and Toradol and Tylenol.  If her headache persists she is asked to call the clinic.  Plan: cyclobenzaprine (FLEXERIL) 5 MG tablet,         ketorolac (TORADOL) 10 MG tablet, acetaminophen        (TYLENOL) 500 MG tablet        FUTURE APPOINTMENTS:       - " Follow-up visit in one week or sooner as needed    Anita Voss [place]  [facility] [place]

## 2019-03-20 NOTE — PATIENT INSTRUCTIONS
Thank you for choosing [facility].  You may be receiving an email and/or telephone survey request from [facility] [employer] regarding your visit today.  Please take a few minutes to respond to the survey to let us know how we are doing.      If you have questions or concerns, please contact us via [employer] or you can contact your care team at [phone].    Our Clinic hours are:  Monday 6:40 am  to 7:00 pm  Tuesday -Friday 6:40 am to 5:00 pm    The [place] outpatient lab hours are:  Monday - Friday 6:10 am to 4:45 pm  Saturdays 7:00 am to 11:00 am  Appointments are required, call [phone]    If you have clinical questions after hours or would like to schedule an appointment,  call the clinic at [phone].  Patient Education     Tension Headache    A muscle tension headache is a very common cause of head pain. It s also called a stress headache. When some people are under stress, they tense the muscles of their shoulder, neck, and scalp without knowing it. If this tension lasts long enough, a headache can occur. A tension headache can be quite painful. It can last for hours or even days.  Home care  Follow these tips when caring for yourself at home:    Don t drive yourself home if you were given pain medicine for your headache. Instead, have someone else drive you home. Try to sleep when you get home. You should feel much better when you wake up.    Put heat on the back of your neck to help ease neck spasm.    Drink only clear liquids or eat a light diet until your symptoms get better. This will help you avoid nausea or vomiting.  How to prevent headaches    Figure out what is causing stress in your life. Learn new ways to handle your stress. Ideas include regular exercise, biofeedback, self-hypnosis, yoga, and meditation. Talk with your healthcare provider to find out more information about managing stress. Many books and digital media are also available on this subject.    Take time  out at the first sign of a tension headache, if possible. Take yourself out of the stressful situation. Find a quiet, comfortable place to sit or lie down and let yourself relax. Heat and deep massage of the tight areas in the neck and shoulders may help ease muscle spasm. You may also get relief from a medicine like ibuprofen or a prescribed muscle relaxant.  Follow-up care  Follow up with your healthcare provider, or as advised. Talk with your provider if you have frequent headaches. He or she can figure out a treatment plan. Ask if you can have medicine to take at home the next time you get a bad headache. This may keep you from having to visit the emergency department in the future. You may need to see a headache specialist (neurologist) if you continue to have headaches.  When to seek medical advice  Call your healthcare provider right away if any of these occur:    Your head pain gets worse during sexual intercourse or strenuous activity    Your head pain doesn t get better within 24 hours    You aren t able to keep liquids down (repeated vomiting)    Fever of 100.4 F (38 C) or higher, or as directed by your healthcare provider    Stiff neck    Extreme drowsiness, confusion, or fainting    Dizziness or dizziness with spinning sensation (vertigo)    Weakness in an arm or leg or one side of your face    You have difficulty speaking    Your vision changes  Date Last Reviewed: 8/1/2016 2000-2018 [employer]. [address] [place] [address], [place] [address]. All rights reserved. This information is not intended as a substitute for professional medical care. Always follow your healthcare professional's instructions.

## 2019-10-22 DIAGNOSIS — F41.9 ANXIETY: ICD-10-CM

## 2019-10-22 NOTE — TELEPHONE ENCOUNTER
"Requested Prescriptions   Pending Prescriptions Disp Refills     citalopram (CELEXA) 20 MG tablet [Pharmacy Med Name: CITALOPRAM HBR 20 MG TABLET] 90 tablet 1     Sig: TAKE 1 TABLET BY MOUTH DAILY   Last Written Prescription Date:  10/30/18  Last Fill Quantity: 90 tab,  # refills: 1   Last office visit: 3/20/2019 with prescribing provider:  KIET Voss   Future Office Visit:        SSRIs Protocol Passed - 10/22/2019  1:58 AM        Passed - Recent (12 mo) or future (30 days) visit within the authorizing provider's specialty     Patient has had an office visit with the authorizing provider or a provider within the authorizing providers department within the previous 12 mos or has a future within next 30 days. See \"Patient Info\" tab in inbasket, or \"Choose Columns\" in Meds & Orders section of the refill encounter.              Passed - Medication is active on med list        Passed - Patient is age 18 or older        Passed - No active pregnancy on record        Passed - No positive pregnancy test in last 12 months          "

## 2019-10-23 RX ORDER — CITALOPRAM HYDROBROMIDE 20 MG/1
TABLET ORAL
Qty: 30 TABLET | Refills: 0 | Status: SHIPPED | OUTPATIENT
Start: 2019-10-23 | End: 2019-12-04

## 2019-11-05 ENCOUNTER — HEALTH MAINTENANCE LETTER (OUTPATIENT)
Age: 34
End: 2019-11-05

## 2019-12-04 DIAGNOSIS — F41.9 ANXIETY: ICD-10-CM

## 2019-12-04 NOTE — TELEPHONE ENCOUNTER
"Requested Prescriptions   Pending Prescriptions Disp Refills     citalopram (CELEXA) 20 MG tablet [Pharmacy Med Name: CITALOPRAM HBR 20 MG TABLET] 90 tablet 1     Sig: TAKE 1 TABLET BY MOUTH DAILY       SSRIs Protocol Passed - 12/4/2019  8:14 AM        Passed - Recent (12 mo) or future (30 days) visit within the authorizing provider's specialty     Patient has had an office visit with the authorizing provider or a provider within the authorizing providers department within the previous 12 mos or has a future within next 30 days. See \"Patient Info\" tab in inbasket, or \"Choose Columns\" in Meds & Orders section of the refill encounter.              Passed - Medication is active on med list        Passed - Patient is age 18 or older        Passed - No active pregnancy on record        Passed - No positive pregnancy test in last 12 months        Last Written Prescription Date:  10/23/19  Last Fill Quantity: 30,  # refills: 0   Last office visit: 3/20/2019 with prescribing provider:  Navneet   Future Office Visit:      "

## 2019-12-09 RX ORDER — CITALOPRAM HYDROBROMIDE 20 MG/1
20 TABLET ORAL DAILY
Qty: 30 TABLET | Refills: 0 | Status: SHIPPED | OUTPATIENT
Start: 2019-12-09 | End: 2020-02-11

## 2020-02-08 DIAGNOSIS — F41.9 ANXIETY: ICD-10-CM

## 2020-02-10 NOTE — TELEPHONE ENCOUNTER
"Requested Prescriptions   Pending Prescriptions Disp Refills     citalopram (CELEXA) 20 MG tablet [Pharmacy Med Name: CITALOPRAM HBR 20 MG TABLET] 30 tablet 0     Sig: TAKE 1 TABLET (20 MG) BY MOUTH DAILY (NEEDS FOLLOW-UP APPOINTMENT FOR THIS MEDICATION)       SSRIs Protocol Passed - 2/8/2020  9:42 AM        Passed - Recent (12 mo) or future (30 days) visit within the authorizing provider's specialty     Patient has had an office visit with the authorizing provider or a provider within the authorizing providers department within the previous 12 mos or has a future within next 30 days. See \"Patient Info\" tab in inbasket, or \"Choose Columns\" in Meds & Orders section of the refill encounter.              Passed - Medication is active on med list        Passed - Patient is age 18 or older        Passed - No active pregnancy on record        Passed - No positive pregnancy test in last 12 months        Last Written Prescription Date:  12/9/2019  Last Fill Quantity: 30,  # refills: 0   Last office visit: 3/20/2019 with prescribing provider:  Bipin   Future Office Visit:      "

## 2020-02-11 NOTE — TELEPHONE ENCOUNTER
Routing refill request to provider for review/approval because:  Dipti given x1 and patient did not follow up, please advise.

## 2020-02-12 RX ORDER — CITALOPRAM HYDROBROMIDE 20 MG/1
20 TABLET ORAL DAILY
Qty: 30 TABLET | Refills: 0 | Status: SHIPPED | OUTPATIENT
Start: 2020-02-12 | End: 2020-03-19

## 2020-03-07 DIAGNOSIS — F41.9 ANXIETY: ICD-10-CM

## 2020-03-09 NOTE — TELEPHONE ENCOUNTER
"Requested Prescriptions   Pending Prescriptions Disp Refills     citalopram (CELEXA) 20 MG tablet [Pharmacy Med Name: CITALOPRAM HBR 20 MG TABLET]  Last Written Prescription Date:  2/12/20  Last Fill Quantity: 30,  # refills: 0   Last Office Visit with [facility], [facility] or [facility] prescribing provider:  3/20/19   Future Office Visit:    Next 5 appointments (look out 90 days)    Apr 03, 2020 10:20 AM CDT  [facility]  Arrive at: [facility] [address] [address]  [phone]          30 tablet 0     Sig: TAKE 1 TABLET (20 MG) BY MOUTH DAILY (NEEDS FOLLOW-UP APPOINTMENT FOR THIS MEDICATION)       SSRIs Protocol Passed - 3/7/2020 10:55 AM        Passed - Recent (12 mo) or future (30 days) visit within the authorizing provider's specialty     Patient has had an office visit with the authorizing provider or a provider within the authorizing providers department within the previous 12 mos or has a future within next 30 days. See \"Patient Info\" tab in inbasket, or \"Choose Columns\" in Meds & Orders section of the refill encounter.              Passed - Medication is active on med list        Passed - Patient is age 18 or older        Passed - No active pregnancy on record        Passed - No positive pregnancy test in last 12 months             "

## 2020-03-09 NOTE — TELEPHONE ENCOUNTER
Routing refill request to provider for review/approval because:  Gagan refill already given x1   Patient needs to be seen because: Last OV 3/20/19 with Dr. Toth    Physical scheduled with Dr. Andrews on 4/3/2020.    Ok to give another gagan refill?    Abby BELLA RN

## 2020-03-11 ENCOUNTER — OFFICE VISIT (OUTPATIENT)
Dept: FAMILY MEDICINE | Facility: CLINIC | Age: 35
End: 2020-03-11
Payer: COMMERCIAL

## 2020-03-11 VITALS
HEART RATE: 77 BPM | SYSTOLIC BLOOD PRESSURE: 110 MMHG | RESPIRATION RATE: 14 BRPM | OXYGEN SATURATION: 100 % | BODY MASS INDEX: 23.42 KG/M2 | WEIGHT: 154 LBS | TEMPERATURE: 98.1 F | DIASTOLIC BLOOD PRESSURE: 70 MMHG

## 2020-03-11 DIAGNOSIS — N64.4 BREAST PAIN, LEFT: Primary | ICD-10-CM

## 2020-03-11 PROCEDURE — 99214 OFFICE O/P EST MOD 30 MIN: CPT | Performed by: INTERNAL MEDICINE

## 2020-03-11 RX ORDER — CITALOPRAM HYDROBROMIDE 20 MG/1
20 TABLET ORAL DAILY
Qty: 30 TABLET | Refills: 0 | OUTPATIENT
Start: 2020-03-11

## 2020-03-11 NOTE — PROGRESS NOTES
Subjective     Jonna Pritchard is a 34 year old female who presents to clinic today for the following health issues:    HPI   Concern - Mass  Onset:Has had this pain intermittently for the last 6 months or so    Description:   Pt noticed a mass under her armpit left, its very painful it hurts when move or resting position    Intensity: moderate    Progression of Symptoms:  same and constant    Accompanying Signs & Symptoms:  See above    Previous history of similar problem:na    Precipitating factors:   Worsened by: na    Alleviating factors:Improved by: na    Therapies Tried and outcome: na    LMP was 2/26.  Not actively preventing pregnancy, but not actively trying.    History of dense breasts      Current Outpatient Medications   Medication Sig Dispense Refill     acetaminophen (TYLENOL) 500 MG tablet Take 1-2 tablets (500-1,000 mg) by mouth every 6 hours as needed for mild pain 100 tablet 0     CALCIUM PO        citalopram (CELEXA) 20 MG tablet TAKE 1 TABLET (20 MG) BY MOUTH DAILY (NEEDS FOLLOW-UP APPOINTMENT FOR THIS MEDICATION) 30 tablet 0     cyclobenzaprine (FLEXERIL) 5 MG tablet Take 1 tablet (5 mg) by mouth daily as needed for other (headache) 42 tablet 0     IBUPROFEN PO Take by mouth as needed for moderate pain       ketorolac (TORADOL) 10 MG tablet Take 1 tablet (10 mg) by mouth every 6 hours as needed for moderate pain 30 tablet 0     Multiple Vitamins-Minerals (MULTIVITAMIN ADULT PO)        SUMAtriptan (IMITREX) 25 MG tablet Take 1-2 tablets (25-50 mg) by mouth at onset of headache for migraine May repeat in 2 hours. Max 8 tablets/24 hours. 18 tablet 1         Reviewed and updated as needed this visit by Provider         Review of Systems   ROS COMP: Constitutional, HEENT, cardiovascular, pulmonary, gi and gu systems are negative, except as otherwise noted.      Objective    /70   Pulse 77   Temp 98.1  F (36.7  C) (Tympanic)   Resp 14   Wt 69.9 kg (154 lb)   SpO2 100%   Breastfeeding No    BMI 23.42 kg/m    Body mass index is 23.42 kg/m .  Physical Exam   GENERAL APPEARANCE: healthy, alert and no distress  BREAST: normal without masses, tenderness or nipple discharge and no palpable axillary masses or adenopathy.  Pain with palpation in the left axilla area          Assessment & Plan     1. Breast pain, left - fibrocystic breast disease vs normal breast pain related to cycle.  Advised to track vs getting imaging now.  Patient elects further monitoring which is reasonable.  If still occurring and not related to cycle, would recommend imaging.  - MA Diagnostic Digital Bilateral; Future  - US Breast Left Complete 4 Quadrants; Future         No follow-ups on file.    Shanta Middleton, DO  [facility]

## 2020-03-18 DIAGNOSIS — F41.9 ANXIETY: ICD-10-CM

## 2020-03-18 NOTE — TELEPHONE ENCOUNTER
"Requested Prescriptions   Pending Prescriptions Disp Refills     citalopram (CELEXA) 20 MG tablet 30 tablet 0     Sig: Take 1 tablet (20 mg) by mouth daily (Needs follow-up appointment for this medication)       SSRIs Protocol Passed - 3/18/2020  2:36 PM        Passed - Recent (12 mo) or future (30 days) visit within the authorizing provider's specialty     Patient has had an office visit with the authorizing provider or a provider within the authorizing providers department within the previous 12 mos or has a future within next 30 days. See \"Patient Info\" tab in inbasket, or \"Choose Columns\" in Meds & Orders section of the refill encounter.              Passed - Medication is active on med list        Passed - Patient is age 18 or older        Passed - No active pregnancy on record        Passed - No positive pregnancy test in last 12 months           Last Written Prescription Date:  2/12/20  Last Fill Quantity: 30,  # refills: 0   Last office visit: 3/11/2020 with prescribing provider:  Navneet   Future Office Visit:   Next 5 appointments (look out 90 days)    Apr 03, 2020 10:20 AM CDT  [facility]  Arrive at: Clinic [address] [facility] [address] [phone]           "

## 2020-03-19 RX ORDER — CITALOPRAM HYDROBROMIDE 20 MG/1
20 TABLET ORAL DAILY
Qty: 30 TABLET | Refills: 0 | Status: SHIPPED | OUTPATIENT
Start: 2020-03-19 | End: 2020-04-16

## 2020-04-10 DIAGNOSIS — F41.9 ANXIETY: ICD-10-CM

## 2020-04-10 RX ORDER — CITALOPRAM HYDROBROMIDE 20 MG/1
20 TABLET ORAL DAILY
Qty: 30 TABLET | Refills: 0 | OUTPATIENT
Start: 2020-04-10

## 2020-04-10 NOTE — TELEPHONE ENCOUNTER
"Requested Prescriptions   Pending Prescriptions Disp Refills     citalopram (CELEXA) 20 MG tablet  Last Written Prescription Date:  3/19/20  Last Fill Quantity: 30,  # refills: 0   [place] [facility] prescribing provider:  3/11/20   Future Office Visit:      30 tablet 0     Sig: Take 1 tablet (20 mg) by mouth daily (Needs follow-up appointment for this medication)       SSRIs Protocol Passed - 4/10/2020  4:00 PM        Passed - Recent (12 mo) or future (30 days) visit within the authorizing provider's specialty     Patient has had an office visit with the authorizing provider or a provider within the authorizing providers department within the previous 12 mos or has a future within next 30 days. See \"Patient Info\" tab in inbasket, or \"Choose Columns\" in Meds & Orders section of the refill encounter.              Passed - Medication is active on med list        Passed - Patient is age 18 or older        Passed - No active pregnancy on record        Passed - No positive pregnancy test in last 12 months             "

## 2020-04-13 NOTE — PROGRESS NOTES
"  SUBJECTIVE:                                                    Jonna Pritchard is a 34 year old female who is being evaluated via a billable telephone visit.    Chief Complaint   Patient presents with     Anxiety         Depression/Anxiety    Pt returns to clinic to follow up on depression/anxiety symptoms and medication Celexa 20 mg. Pt states doing well on this medication without side effects.    PHQ-9 Interpretation:  0-9  Not Major Depression  10-19  Mild/Moderate Depression monthly contacts, meds and therapy  20-27  Severe Depression, weekly contacts, meds and therapy  PHQ 9/15/2017 10/17/2017 2020   PHQ-9 Total Score 7 2 2   Q9: Thoughts of better off dead/self-harm past 2 weeks Not at all Not at all Not at all   GAD7 score   Interpretation:    0-5  mild anxiety,   6-10 moderate anxiety   11-15 severe anxiety  Last 3 GAD7 scores on record =  ABDOUL-7 SCORE 9/15/2017 10/17/2017 2020   Total Score 16 3 2                       The patient has been notified of following:     \"This telephone visit will be conducted via a call between you and your physician/provider. We have found that certain health care needs can be provided without the need for a physical exam.  This service lets us provide the care you need with a short phone conversation.  If a prescription is necessary we can send it directly to your pharmacy.  If lab work is needed we can place an order for that and you can then stop by our lab to have the test done at a later time.    If during the course of the call the physician/provider feels a telephone visit is not appropriate, you will not be charged for this service.\"     I have reviewed and updated the patient's Past Medical History, Social History, Family History and Medication List.  Leonila JIMÉNEZ CMA        Problem list and histories reviewed & adjusted, as indicated.  Additional history: as documented    Patient Active Problem List   Diagnosis     History of  section     Anxiety     " Other migraine without status migrainosus, not intractable     Past Surgical History:   Procedure Laterality Date      SECTION  2015       Social History     Tobacco Use     Smoking status: Former Smoker     Packs/day: 0.00     Years: 8.00     Pack years: 0.00     Last attempt to quit: 2011     Years since quittin.2     Smokeless tobacco: Never Used   Substance Use Topics     Alcohol use: Yes     Alcohol/week: 0.0 standard drinks     Comment: rarely     Family History   Problem Relation Age of Onset     Diabetes Father 45     Hyperlipidemia Father      Diabetes Maternal Grandfather      Diabetes Paternal Grandmother      Hyperlipidemia Paternal Grandmother      Family History Negative Mother      Hyperlipidemia Mother          Current Outpatient Medications   Medication Sig Dispense Refill     CALCIUM PO        citalopram (CELEXA) 20 MG tablet Take 1 tablet (20 mg) by mouth daily (Needs follow-up appointment for this medication) 90 tablet 3     cyclobenzaprine (FLEXERIL) 5 MG tablet Take 1 tablet (5 mg) by mouth daily as needed for other (headache) 42 tablet 0     IBUPROFEN PO Take by mouth as needed for moderate pain       ketorolac (TORADOL) 10 MG tablet Take 1 tablet (10 mg) by mouth every 6 hours as needed for moderate pain 30 tablet 0     Multiple Vitamins-Minerals (MULTIVITAMIN ADULT PO)        SUMAtriptan (IMITREX) 25 MG tablet Take 1-2 tablets (25-50 mg) by mouth at onset of headache for migraine May repeat in 2 hours. Max 8 tablets/24 hours. 18 tablet 1     Allergies   Allergen Reactions     Penicillins      No lab results found.   BP Readings from Last 3 Encounters:   20 110/70   19 100/70   19 105/67    Wt Readings from Last 3 Encounters:   20 69.9 kg (154 lb)   19 68.9 kg (152 lb)   19 68 kg (150 lb)         ROS:  Constitutional, HEENT, cardiovascular, pulmonary, gi and gu systems are negative, except as otherwise noted.    OBJECTIVE:                                                     There were no vitals taken for this visit.  Phone visit  Diagnostic Test Results:  none      Phone call duration:  5 minutes      ASSESSMENT/PLAN:                                                    1. Anxiety  due for review and refill, taking medication without difficulty.  Was planning to stop but with COVID19 pandemic and all the restrictions prefers to stay on it.  - citalopram (CELEXA) 20 MG tablet; Take 1 tablet (20 mg) by mouth daily (Needs follow-up appointment for this medication)  Dispense: 90 tablet; Refill: 3    Loretta Andrews MD  [facility]

## 2020-04-16 ENCOUNTER — VIRTUAL VISIT (OUTPATIENT)
Dept: FAMILY MEDICINE | Facility: CLINIC | Age: 35
End: 2020-04-16
Payer: COMMERCIAL

## 2020-04-16 DIAGNOSIS — F41.9 ANXIETY: ICD-10-CM

## 2020-04-16 PROCEDURE — 99213 OFFICE O/P EST LOW 20 MIN: CPT | Mod: 95 | Performed by: FAMILY MEDICINE

## 2020-04-16 RX ORDER — CITALOPRAM HYDROBROMIDE 20 MG/1
20 TABLET ORAL DAILY
Qty: 90 TABLET | Refills: 3 | Status: SHIPPED | OUTPATIENT
Start: 2020-04-16 | End: 2021-09-03

## 2020-04-16 ASSESSMENT — ANXIETY QUESTIONNAIRES
GAD7 TOTAL SCORE: 2
2. NOT BEING ABLE TO STOP OR CONTROL WORRYING: NOT AT ALL
IF YOU CHECKED OFF ANY PROBLEMS ON THIS QUESTIONNAIRE, HOW DIFFICULT HAVE THESE PROBLEMS MADE IT FOR YOU TO DO YOUR WORK, TAKE CARE OF THINGS AT HOME, OR GET ALONG WITH OTHER PEOPLE: NOT DIFFICULT AT ALL
3. WORRYING TOO MUCH ABOUT DIFFERENT THINGS: NOT AT ALL
6. BECOMING EASILY ANNOYED OR IRRITABLE: SEVERAL DAYS
1. FEELING NERVOUS, ANXIOUS, OR ON EDGE: SEVERAL DAYS
7. FEELING AFRAID AS IF SOMETHING AWFUL MIGHT HAPPEN: NOT AT ALL
5. BEING SO RESTLESS THAT IT IS HARD TO SIT STILL: NOT AT ALL

## 2020-04-16 ASSESSMENT — PATIENT HEALTH QUESTIONNAIRE - PHQ9
SUM OF ALL RESPONSES TO PHQ QUESTIONS 1-9: 2
5. POOR APPETITE OR OVEREATING: NOT AT ALL

## 2020-04-17 ASSESSMENT — ANXIETY QUESTIONNAIRES: GAD7 TOTAL SCORE: 2

## 2020-06-09 ENCOUNTER — ANCILLARY PROCEDURE (OUTPATIENT)
Dept: GENERAL RADIOLOGY | Facility: CLINIC | Age: 35
End: 2020-06-09
Attending: INTERNAL MEDICINE
Payer: COMMERCIAL

## 2020-06-09 ENCOUNTER — OFFICE VISIT (OUTPATIENT)
Dept: FAMILY MEDICINE | Facility: CLINIC | Age: 35
End: 2020-06-09
Payer: COMMERCIAL

## 2020-06-09 VITALS
RESPIRATION RATE: 16 BRPM | HEIGHT: 68 IN | DIASTOLIC BLOOD PRESSURE: 62 MMHG | BODY MASS INDEX: 22.43 KG/M2 | HEART RATE: 70 BPM | SYSTOLIC BLOOD PRESSURE: 114 MMHG | OXYGEN SATURATION: 98 % | TEMPERATURE: 97.7 F | WEIGHT: 148 LBS

## 2020-06-09 DIAGNOSIS — M25.562 ACUTE PAIN OF LEFT KNEE: Primary | ICD-10-CM

## 2020-06-09 PROCEDURE — 73562 X-RAY EXAM OF KNEE 3: CPT | Mod: LT

## 2020-06-09 PROCEDURE — 99214 OFFICE O/P EST MOD 30 MIN: CPT | Performed by: INTERNAL MEDICINE

## 2020-06-09 ASSESSMENT — PAIN SCALES - GENERAL: PAINLEVEL: NO PAIN (0)

## 2020-06-09 ASSESSMENT — MIFFLIN-ST. JEOR: SCORE: 1409.82

## 2020-06-09 NOTE — PROGRESS NOTES
Subjective     Jonna Pritchard is a 34 year old female who presents to clinic today for the following health issues:    HPI   Concern - hard lump on inside of left knee    Onset: pain x 6-9 months, lump noticed about 3 months ago    /Description:   Hard lump left knee, will be painful at times, about 1 x per week.  No known injury.  Pain worse at night.    Intensity: 0/10 currently, 7-8/10 at worst    Progression of Symptoms:  worsening    Accompanying Signs & Symptoms:  none    Previous history of similar problem:   none    Precipitating factors:   Worsened by: none known    Alleviating factors:Improved by: none    Therapies Tried and outcome: heat, ice, wrapping - has not helped    Pain in the area can wake her up at night or make it difficult to fall asleep    Uses ellipitcal 5 days per week, but this does not seem to worsen the pain.  Going > 1 week w/out exercise does not seem    Pain present for many months, but the lump/bump first noticed about 3 months ago.      Current Outpatient Medications   Medication Sig Dispense Refill     CALCIUM PO        citalopram (CELEXA) 20 MG tablet Take 1 tablet (20 mg) by mouth daily (Needs follow-up appointment for this medication) 90 tablet 3     IBUPROFEN PO Take by mouth as needed for moderate pain       Multiple Vitamins-Minerals (MULTIVITAMIN ADULT PO)        cyclobenzaprine (FLEXERIL) 5 MG tablet Take 1 tablet (5 mg) by mouth daily as needed for other (headache) (Patient not taking: Reported on 6/9/2020) 42 tablet 0     ketorolac (TORADOL) 10 MG tablet Take 1 tablet (10 mg) by mouth every 6 hours as needed for moderate pain (Patient not taking: Reported on 6/9/2020) 30 tablet 0     SUMAtriptan (IMITREX) 25 MG tablet Take 1-2 tablets (25-50 mg) by mouth at onset of headache for migraine May repeat in 2 hours. Max 8 tablets/24 hours. (Patient not taking: Reported on 6/9/2020) 18 tablet 1         Reviewed and updated as needed this visit by Provider         Review of  "Systems   Constitutional, HEENT, cardiovascular, pulmonary, gi and gu systems are negative, except as otherwise noted.      Objective    /62 (BP Location: Right arm, Patient Position: Chair, Cuff Size: Adult Regular)   Pulse 70   Temp 97.7  F (36.5  C) (Tympanic)   Resp 16   Ht 1.727 m (5' 8\")   Wt 67.1 kg (148 lb)   SpO2 98%   BMI 22.50 kg/m    Body mass index is 22.5 kg/m .  Physical Exam   GENERAL APPEARANCE: healthy, alert and no distress  MS: prominence of left medial promimal tibia.  No discrete mass, but clear bony prominence.  Normal ROM of bilateral knees, no tenderness to palpation  SKIN: no warmth, erythema, effusion of left knee            Assessment & Plan     1. Acute pain of left knee - asymmetrical bony prominence vs malignant vs non-malignant tumor.  If xray is normal, will refer to Sports Medicine given ongoing pain that is waking her up at night.  Patient wants to hold off on this for a while due to concerns about COVID.  If xray is normal and pain persists in this manner another 2-4 weeks, then may consider MRI for closer look  - XR Knee Left 3 Views       Patient Instructions   1. You are due for a pap test.  Please schedule appointment later this summer  2. If xray is normal, recommend Sports Medicine evaluation  3. Ice or heat.  Advil or Tylenol         No follow-ups on file.    Shanta Middleton, DO  [facility]      "

## 2020-06-09 NOTE — PATIENT INSTRUCTIONS
1. You are due for a pap test.  Please schedule appointment later this summer  2. If xray is normal, recommend Sports Medicine evaluation  3. Ice or heat.  Advil or Tylenol

## 2020-06-09 NOTE — RESULT ENCOUNTER NOTE
The x-ray is normal.  It is possible that the lump you are feeling is more in the muscle or skin tissues and not part of the bone.  MRI would be a better test, order placed.  If you want to wait a few weeks or month or two, that is reasonable.

## 2020-09-14 ENCOUNTER — IMMUNIZATION (OUTPATIENT)
Dept: FAMILY MEDICINE | Facility: CLINIC | Age: 35
End: 2020-09-14
Payer: COMMERCIAL

## 2020-09-14 DIAGNOSIS — Z23 NEED FOR PROPHYLACTIC VACCINATION AND INOCULATION AGAINST INFLUENZA: Primary | ICD-10-CM

## 2020-09-14 PROCEDURE — 90471 IMMUNIZATION ADMIN: CPT

## 2020-09-14 PROCEDURE — 90686 IIV4 VACC NO PRSV 0.5 ML IM: CPT

## 2020-11-22 ENCOUNTER — HEALTH MAINTENANCE LETTER (OUTPATIENT)
Age: 35
End: 2020-11-22

## 2021-04-13 ENCOUNTER — E-VISIT (OUTPATIENT)
Dept: FAMILY MEDICINE | Facility: CLINIC | Age: 36
End: 2021-04-13
Payer: COMMERCIAL

## 2021-04-13 DIAGNOSIS — M25.569 KNEE PAIN, UNSPECIFIED CHRONICITY, UNSPECIFIED LATERALITY: Primary | ICD-10-CM

## 2021-04-13 PROCEDURE — 99207 PR NON-BILLABLE SERV PER CHARTING: CPT | Performed by: INTERNAL MEDICINE

## 2021-04-13 NOTE — PATIENT INSTRUCTIONS
Thank you for choosing us for your care. I think an in-clinic visit would be best next steps based on your symptoms. Please schedule a clinic appointment; you won t be charged for this eVisit.      You can schedule an appointment right here in [facility], or call [phone]

## 2021-04-15 NOTE — PROGRESS NOTES
Assessment & Plan     Chronic pain of left knee - given long standing nature, no improvement with conservative care and severe pain that wakes her up at night, will get MRI.  Other home care as below.  May need referral to physical therapy if MRI is negative for structural issues, vs ortho if ligament/meniscal problme  - MR Knee Left w/o Contrast; Future         Patient Instructions   1. Get MRI  2. Take Ibuprofen up to 800 mg, not on empty stomach. Can combine with Tylenol 1000 mg at night.  3. Try topicals such as Voltaren, Aspercream with Lidocaine, Capsaicin, Icy Hot, Biofreeze, Salon Pas         No follow-ups on file.    Shanta Middleton DO  [facility]    Maxi Coyle is a 35 year old who presents for the following health issues     HPI     Last seeing 6/9/20 - acute pain left knee    Musculoskeletal problem/pain  Onset/Duration: jan 2020  Description  Location: knee - left and right (left is worst)  Joint Swelling: no  Redness: no  Pain: YES- Current 2/10 - worst (try to sleep 8/10) waking her up (for the past week)  Warmth: no  Intensity:  Moderate/ mild  Progression of Symptoms:  worsening and intermittent  Accompanying signs and symptoms:   Fevers: no  Numbness/tingling/weakness: no- has a shooting pain to toes and up to hip  History  Trauma to the area: no  Recent illness:  no  Previous similar problem: YES  Previous evaluation:  YES  Precipitating or alleviating factors:  Aggravating factors include: none  Therapies tried and outcome: heat, ibuprofen 400 mg BID regularly for a few weeks, ice prior to exercise.  --she doctored for knee pain in June.  Onset was early 2020  --pain is not daily  --pain has woken her up at night  --no change in lump in the knee  --no weakness or give way sensation  --has modified activity which is no longer helping.  Has had to stop exercising entirely due to pain        Review of Systems   Constitutional, HEENT, cardiovascular, pulmonary,  gi and gu systems are negative, except as otherwise noted.      Objective    /80   Pulse 72   Temp 98.3  F (36.8  C) (Tympanic)   Resp 16   SpO2 100%   Breastfeeding No   There is no height or weight on file to calculate BMI.  Physical Exam   GENERAL APPEARANCE: healthy, alert and no distress  ORTHO: Knee Exam: Inspection: AP/lateral alignment normal, No effusion, No quad atrophy.  Mild prominence of left femoral medial condyle  Tender: none  Non-tender: patella tendon, quadriceps insertion, MCL, LCL, lateral joint line, medial joint line, medial tibial plateau, lateral tibial plateau, medial femoral condyle, lateral femoral condyle, prepatellar bursa, popliteal region, pes anserine bursa  Active Range of Motion: full flexion, no pain with flexion, full extension, no pain with extension  Strength: quad  5/5, Hamstrings  5/5, Gastroc  5/5 and core strength  normal  Special tests: normal Valgus stress test, normal Varus, negative Lachman's test, negative hyperflexion external rotation test, negative Devora's , no apprehension with lateral stress of the patella    Also examined: foot strong dorsalis pedis pulse, normal sensation to light touch bilateral upper and lower legs

## 2021-04-16 ENCOUNTER — OFFICE VISIT (OUTPATIENT)
Dept: FAMILY MEDICINE | Facility: CLINIC | Age: 36
End: 2021-04-16
Payer: COMMERCIAL

## 2021-04-16 VITALS
HEART RATE: 72 BPM | DIASTOLIC BLOOD PRESSURE: 80 MMHG | OXYGEN SATURATION: 100 % | RESPIRATION RATE: 16 BRPM | TEMPERATURE: 98.3 F | SYSTOLIC BLOOD PRESSURE: 110 MMHG

## 2021-04-16 DIAGNOSIS — M25.562 CHRONIC PAIN OF LEFT KNEE: Primary | ICD-10-CM

## 2021-04-16 DIAGNOSIS — G89.29 CHRONIC PAIN OF LEFT KNEE: Primary | ICD-10-CM

## 2021-04-16 PROCEDURE — 99213 OFFICE O/P EST LOW 20 MIN: CPT | Performed by: INTERNAL MEDICINE

## 2021-04-16 NOTE — PATIENT INSTRUCTIONS
1. Get MRI  2. Take Ibuprofen up to 800 mg, not on empty stomach. Can combine with Tylenol 1000 mg at night.  3. Try topicals such as Voltaren, Aspercream with Lidocaine, Capsaicin, Icy Hot, Biofreeze, Salon Pas

## 2021-04-19 ENCOUNTER — HOSPITAL ENCOUNTER (OUTPATIENT)
Dept: MRI IMAGING | Facility: CLINIC | Age: 36
Discharge: HOME | End: 2021-04-19
Attending: INTERNAL MEDICINE | Admitting: INTERNAL MEDICINE
Payer: COMMERCIAL

## 2021-04-19 DIAGNOSIS — G89.29 CHRONIC PAIN OF LEFT KNEE: ICD-10-CM

## 2021-04-19 DIAGNOSIS — M23.204 DEGENERATIVE TEAR OF LEFT MEDIAL MENISCUS: Primary | ICD-10-CM

## 2021-04-19 DIAGNOSIS — M25.562 CHRONIC PAIN OF LEFT KNEE: ICD-10-CM

## 2021-04-19 PROCEDURE — 73721 MRI JNT OF LWR EXTRE W/O DYE: CPT | Mod: 26 | Performed by: RADIOLOGY

## 2021-04-19 PROCEDURE — 73721 MRI JNT OF LWR EXTRE W/O DYE: CPT | Mod: LT

## 2021-04-19 NOTE — RESULT ENCOUNTER NOTE
There is a tear of the medial meniscus with associated cysts (this is likely the lump felt).  I will refer you to Orthopedic surgery.    Recommend to scale back activity/exercise

## 2021-04-21 ENCOUNTER — MEDICAL CORRESPONDENCE (OUTPATIENT)
Dept: HEALTH INFORMATION MANAGEMENT | Facility: CLINIC | Age: 36
End: 2021-04-21

## 2021-04-21 ENCOUNTER — TRANSFERRED RECORDS (OUTPATIENT)
Dept: HEALTH INFORMATION MANAGEMENT | Facility: CLINIC | Age: 36
End: 2021-04-21

## 2021-04-28 ENCOUNTER — HOSPITAL ENCOUNTER (OUTPATIENT)
Dept: PHYSICAL THERAPY | Facility: CLINIC | Age: 36
Setting detail: THERAPIES SERIES
End: 2021-04-28
Attending: ORTHOPAEDIC SURGERY
Payer: COMMERCIAL

## 2021-04-28 PROCEDURE — 97161 PT EVAL LOW COMPLEX 20 MIN: CPT | Mod: GP | Performed by: PHYSICAL THERAPIST

## 2021-04-28 PROCEDURE — 97110 THERAPEUTIC EXERCISES: CPT | Mod: GP | Performed by: PHYSICAL THERAPIST

## 2021-04-28 NOTE — PROGRESS NOTES
04/28/21 0700   General Information   Type of Visit Initial OP Ortho PT Evaluation   Start of Care Date 04/28/21   Referring Physician Azam Chowdhury   Patient/Family Goals Statement get back to work outs    Orders Evaluate and Treat   Date of Order 04/19/21   Medical Diagnosis degenerative tear of left medial meniscus    Surgical/Medical history reviewed Yes   Precautions/Limitations no known precautions/limitations   General Information Comments PMH: none    Body Part(s)   Body Part(s) Knee;Lumbar Spine/SI   Presentation and Etiology   Pertinent history of current problem (include personal factors and/or comorbidities that impact the POC) Has had knee pain for the past year. Had an MRI that confirmed a menisucs tear. Worse pain at night. Shooting pain/burning that goes down to the foot and up to the hip. NOt sure if this pain is from the menisucs tear or if it's from nerve pain. No back pain. sleeping she can't find a comfortable  position due to the nerve pain running down her leg. Knee pain has been there for a year but the nerve pain is new in the past 3-4 weeks.    Impairments A. Pain;B. Decreased WB tolerance;L. Tingling   Functional Limitations perform activities of daily living;perform required work activities;perform desired leisure / sports activities   Symptom Location left knee and leg   How/Where did it occur From insidious onset   Onset date of current episode/exacerbation 04/28/20   Chronicity Chronic   Pain rating (0-10 point scale) Best (/10);Worst (/10)   Best (/10) 1   Worst (/10) 8   Pain quality A. Sharp;D. Burning;E. Shooting   Frequency of pain/symptoms C. With activity   Pain/symptoms are: Worse during the night   Pain/symptoms exacerbated by B. Walking;E. Rest;G. Certain positions   Pain/symptoms eased by C. Rest;H. Cold   Progression of symptoms since onset: Worsened   Current / Previous Interventions   Diagnostic Tests: MRI   MRI Results Results   MRI results see charts: meniscus  tear    Current Level of Function   Patient role/employment history A. Employed   Fall Risk Screen   Fall screen completed by PT   Have you fallen 2 or more times in the past year? No   Have you fallen and had an injury in the past year? No   Is patient a fall risk? No   Abuse Screen (yes response referral indicated)   Feels Unsafe at Home or Work/School no   Feels Threatened by Someone no   Does Anyone Try to Keep You From Having Contact with Others or Doing Things Outside Your Home? no   Physical Signs of Abuse Present no   Functional Scales   Functional Scales Other   Other Scales  LEFS   Knee Objective Findings   Side (if bilateral, select both right and left) Left   Gait/Locomotion normal, no concerns,    Foot Position In Standing very slight external rotation on left side    Step Test Height 6   Step Test Height Control Comment tightness across anterior knee with slight valgus collapse    Varus Stress Test negative L    Valgus Stress Test negative L    Devora's Test negative L    Apley's Test negative L    Knee Special Test Comments Thessaleys- some pain on medial side of knee    Palpation non tender to palpation across knee joint line,  ttp across hip flexors and middle of hamstring but doesn't recreate her specific nerve pain    Accessory Motion/Joint Mobility WNL   Left Knee Extension AROM WNL    Left Knee Flexion AROM WNL    Left Knee Flexion Strength 5/5    Left Knee Extension Strength 5/5    Left Hip Abduction Strength 4/5    Left Gastrocnemius Flexibility WNL   Left Hamstring Flexibility WnL   Left Hip Flexor Flexibility WNL   Left Quadricep Flexibility WNL   Lumbar Spine/SI Objective Findings   Flexion ROM finger to toes    Extension ROM WNL    Right Side Bending ROM WNL   Left Side Bending ROM WNL    Repeated Extension-Standing ROM no change in pain    Repeated Flexion-Standing ROM no change in pain    Hip Flexion (L2) Strength right: 4+/5, left 4/5    Hip Extension Strength 5/5 B    Knee Flexion  Strength 5/5 B    Knee Extension (L3) Strength 5/5 B    Ankle Dorsiflexion (L4) Strength 5/5 B   Great Toe Extension (L5) Strength 5/5 B    Ankle Plantar Flexion (S1) Strength 5/5 B    SLR + on the left for nerve pain along thigh and down into lower leg    Abilio Test WNL B    Ely Test - B    Slump Test - B    Spring Test negative for pain    Segmental Mobility wNL    Sensation Testing wNL    Palpation non tender to palpation through glut max and glut med and piriformis    Planned Therapy Interventions   Planned Therapy Interventions balance training;bed mobility training;gait training;joint mobilization;manual therapy;neuromuscular re-education;ROM;strengthening;stretching;transfer training   Clinical Impression   Criteria for Skilled Therapeutic Interventions Met yes, treatment indicated   PT Diagnosis decreased left LE strength    Influenced by the following impairments nerve tension on left, decreased left LE strength    Functional limitations due to impairments sleeping, prolonged periods of walking or standing    Clinical Presentation Stable/Uncomplicated   Clinical Presentation Rationale clinical decision making and chart review    Clinical Decision Making (Complexity) Low complexity   Therapy Frequency 1 time/week   Predicted Duration of Therapy Intervention (days/wks) 8 weeks   Risk & Benefits of therapy have been explained Yes   Patient, Family & other staff in agreement with plan of care Yes   Clinical Impression Comments Patient is a 35 year old female who presents to PT with confirmed MRI of left menisucs tear but has complaints of shooting pain that radiates from the hip to the knee and into the inside of the foot at night. Pain on the medial side of the knee appears to be coming from the meniscus however the radiating pain from the hip to the foot seems to be coming from weakness in and around the SI joint. Patient would benefit from skilled PT intervention to improve impairments listed above.     Education Assessment   Preferred Learning Style Listening;Demonstration;Pictures/video   Barriers to Learning No barriers   ORTHO GOALS   PT Ortho Eval Goals 1;2;3;4   Ortho Goal 1   Goal Identifier 1   Goal Description Patient will be independent with HEP for strengthening outside of PT.    Target Date 05/26/21   Ortho Goal 2   Goal Identifier 2   Goal Description Patient will report less than a 3/10 pain after taking the dog for a walk.    Target Date 05/26/21   Ortho Goal 3   Goal Identifier 3   Goal Description Patient will be able to sleep at night without waking up to left leg pain.    Target Date 06/23/21   Ortho Goal 4   Goal Identifier 4   Goal Description Patient will be able to return to using the ellipitical and her other work outs without increased left leg pain.    Target Date 06/23/21   Total Evaluation Time   PT Kristi, Low Complexity Minutes (35256) 23       Julia Andersen  PT, DPT       4/28/2021   [address] [address]  [email or web address]  Voicemail: [phone]

## 2021-05-05 ENCOUNTER — HOSPITAL ENCOUNTER (OUTPATIENT)
Dept: PHYSICAL THERAPY | Facility: CLINIC | Age: 36
Setting detail: THERAPIES SERIES
End: 2021-05-05
Attending: ORTHOPAEDIC SURGERY
Payer: COMMERCIAL

## 2021-05-05 PROCEDURE — 97110 THERAPEUTIC EXERCISES: CPT | Mod: GP | Performed by: PHYSICAL THERAPIST

## 2021-05-12 ENCOUNTER — HOSPITAL ENCOUNTER (OUTPATIENT)
Dept: PHYSICAL THERAPY | Facility: CLINIC | Age: 36
Setting detail: THERAPIES SERIES
End: 2021-05-12
Attending: ORTHOPAEDIC SURGERY
Payer: COMMERCIAL

## 2021-05-12 PROCEDURE — 97140 MANUAL THERAPY 1/> REGIONS: CPT | Mod: GP | Performed by: PHYSICAL THERAPIST

## 2021-05-12 PROCEDURE — 97110 THERAPEUTIC EXERCISES: CPT | Mod: GP | Performed by: PHYSICAL THERAPIST

## 2021-05-26 ENCOUNTER — HOSPITAL ENCOUNTER (OUTPATIENT)
Dept: PHYSICAL THERAPY | Facility: CLINIC | Age: 36
Setting detail: THERAPIES SERIES
End: 2021-05-26
Attending: ORTHOPAEDIC SURGERY
Payer: COMMERCIAL

## 2021-05-26 PROCEDURE — 97110 THERAPEUTIC EXERCISES: CPT | Mod: GP | Performed by: PHYSICAL THERAPIST

## 2021-05-26 NOTE — PROGRESS NOTES
Addendum: 2021   This note acts as a discharge note as patient did not need to return for continued therapy. At last attended visit when progress note was written, patient was meeting all their goals and the plan was to discharge if no new symptoms arose within 30 days.     Outpatient Physical Therapy Progress Note     Patient: Jonna Pritchard  : 1985    Beginning/End Dates of Reporting Period:  21 to 2021    Referring Provider:  Azam Chowdhury    Therapy Diagnosis: decreased left LE strength     Client Self Report: Knee pain is not constant anymore. Has occasional twinges in knee pain. Has been able to take the dog out for walks without any issues and has been able to use the ellipitical. Has noticed occasional twinge of pain with certain leg exercises so she just doesn't do these.     Objective Measurements:  Objective Measure: palpation   Details: mild tenderness along medial joint line         Goals:  Goal Identifier 1   Goal Description Patient will be independent with HEP for strengthening outside of PT.    Target Date 21   Date Met  21   Progress:     Goal Identifier 2   Goal Description Patient will report less than a 3/10 pain after taking the dog for a walk.    Target Date 21   Date Met  21   Progress:     Goal Identifier 3   Goal Description Patient will be able to sleep at night without waking up to left leg pain.    Target Date 21   Date Met  21   Progress:     Goal Identifier 4   Goal Description Patient will be able to return to using the ellipitical and her other work outs without increased left leg pain.    Target Date 21   Date Met  21   Progress:       Progress Toward Goals:   Progress this reporting period: Patient has no had any nerve pain down her leg in over a month now. Her knee pain is minimal and she only notices it if she twists wrong, not bothering throughout the day. Left leg still weaker than right so exercises  were progressed again to improve hip/quad stabilization strength. Tolerated well. AT this time, patient has met all her therapy goals and has a good HEP to progress her strength to a functional level. She plans to continue with her HEP independently and f/u only if needed for more PT.       Plan:  Other: continue with HEP independently    Discharge:  Discharge chart in 30 days if no new issues arise      Julai Andersen  PT, DPT       5/26/2021   [address] [address]  [email or web address]  Voicemail: [phone]

## 2021-08-06 ENCOUNTER — TRANSFERRED RECORDS (OUTPATIENT)
Dept: HEALTH INFORMATION MANAGEMENT | Facility: CLINIC | Age: 36
End: 2021-08-06

## 2021-09-03 ENCOUNTER — OFFICE VISIT (OUTPATIENT)
Dept: FAMILY MEDICINE | Facility: CLINIC | Age: 36
End: 2021-09-03
Payer: COMMERCIAL

## 2021-09-03 VITALS
OXYGEN SATURATION: 98 % | WEIGHT: 150 LBS | TEMPERATURE: 97.7 F | BODY MASS INDEX: 22.73 KG/M2 | HEART RATE: 89 BPM | RESPIRATION RATE: 16 BRPM | SYSTOLIC BLOOD PRESSURE: 116 MMHG | HEIGHT: 68 IN | DIASTOLIC BLOOD PRESSURE: 68 MMHG

## 2021-09-03 DIAGNOSIS — Z13.1 SCREENING FOR DIABETES MELLITUS: ICD-10-CM

## 2021-09-03 DIAGNOSIS — Z13.6 CARDIOVASCULAR SCREENING; LDL GOAL LESS THAN 100: ICD-10-CM

## 2021-09-03 DIAGNOSIS — G43.809 OTHER MIGRAINE WITHOUT STATUS MIGRAINOSUS, NOT INTRACTABLE: ICD-10-CM

## 2021-09-03 DIAGNOSIS — Z01.818 PREOP GENERAL PHYSICAL EXAM: Primary | ICD-10-CM

## 2021-09-03 DIAGNOSIS — F41.9 ANXIETY: ICD-10-CM

## 2021-09-03 DIAGNOSIS — S83.242D TEAR OF MEDIAL MENISCUS OF LEFT KNEE, CURRENT, UNSPECIFIED TEAR TYPE, SUBSEQUENT ENCOUNTER: ICD-10-CM

## 2021-09-03 LAB — HCG UR QL: NEGATIVE

## 2021-09-03 PROCEDURE — 99214 OFFICE O/P EST MOD 30 MIN: CPT | Performed by: INTERNAL MEDICINE

## 2021-09-03 PROCEDURE — 81025 URINE PREGNANCY TEST: CPT | Performed by: INTERNAL MEDICINE

## 2021-09-03 RX ORDER — CITALOPRAM HYDROBROMIDE 20 MG/1
20 TABLET ORAL DAILY
Qty: 90 TABLET | Refills: 3 | Status: SHIPPED | OUTPATIENT
Start: 2021-09-03 | End: 2022-07-18

## 2021-09-03 ASSESSMENT — MIFFLIN-ST. JEOR: SCORE: 1408.9

## 2021-09-03 NOTE — PATIENT INSTRUCTIONS

## 2021-09-03 NOTE — PROGRESS NOTES
[facility]  [address] [address]  Phone: [phone]  Primary Provider: Missael Toth  Pre-op Performing Provider: ALEXANDRA BRICENO      PREOPERATIVE EVALUATION:  Today's date: 9/3/2021    Jonna Pritchard is a 36 year old female who presents for a preoperative evaluation.    Surgical Information:  Surgery/Procedure: Left Knee arthroscopic repair vs meniscectomy - left medial meniscus tear  Surgery Location: [facility])  Surgeon: Chris Chowdhury  Surgery Date: 9/16/21  Time of Surgery: tbd  Where patient plans to recover: At home with family  Fax number for surgical facility: [phone]    Type of Anesthesia Anticipated: General    Assessment & Plan     The proposed surgical procedure is considered INTERMEDIATE risk.    Preop general physical exam  - HCG Qual, Urine ([id number]); Future    Tear of medial meniscus of left knee, current, unspecified tear type, subsequent encounter    Other migraine without status migrainosus, not intractable    Anxiety  - stable, refill provided  - citalopram (CELEXA) 20 MG tablet; Take 1 tablet (20 mg) by mouth daily    CARDIOVASCULAR SCREENING; LDL GOAL LESS THAN 100 due in march  - Lipid panel reflex to direct LDL Fasting; Future    Screening for diabetes mellitus - due in march  - Basic metabolic panel  (Ca, Cl, CO2, Creat, Gluc, K, Na, BUN); Future         Risks and Recommendations:  The patient has the following additional risks and recommendations for perioperative complications:   - No identified additional risk factors other than previously addressed    Medication Instructions:  Patient is to take all scheduled medications on the day of surgery    RECOMMENDATION:  APPROVAL GIVEN to proceed with proposed procedure, without further diagnostic evaluation.                      Subjective     HPI related to upcoming procedure: Left knee surgery    Preop Questions 8/31/2021   1. Have you ever had a heart attack or  stroke? No   2. Have you ever had surgery on your heart or blood vessels, such as a stent placement, a coronary artery bypass, or surgery on an artery in your head, neck, heart, or legs? No   3. Do you have chest pain with activity? No   4. Do you have a history of  heart failure? No   5. Do you currently have a cold, bronchitis or symptoms of other infection? No   6. Do you have a cough, shortness of breath, or wheezing? No   7. Do you or anyone in your family have previous history of blood clots? No   8. Do you or does anyone in your family have a serious bleeding problem such as prolonged bleeding following surgeries or cuts? No   9. Have you ever had problems with anemia or been told to take iron pills? No   10. Have you had any abnormal blood loss such as black, tarry or bloody stools, or abnormal vaginal bleeding? No   11. Have you ever had a blood transfusion? No   12. Are you willing to have a blood transfusion if it is medically needed before, during, or after your surgery? Yes   13. Have you or any of your relatives ever had problems with anesthesia? No   14. Do you have sleep apnea, excessive snoring or daytime drowsiness? No   15. Do you have any artifical heart valves or other implanted medical devices like a pacemaker, defibrillator, or continuous glucose monitor? No   16. Do you have artificial joints? No   17. Are you allergic to latex? No   18. Is there any chance that you may be pregnant? No       Health Care Directive:  Patient does not have a Health Care Directive or Living Will: Discussed advance care planning with patient; information given to patient to review.    Preoperative Review of :   reviewed - no record of controlled substances prescribed.      Status of Chronic Conditions:  See problem list for active medical problems.  Problems all longstanding and stable, except as noted/documented.  See ROS for pertinent symptoms related to these conditions.       Anxiety: celexa is working  well, no side effects.  Using regularly, sometimes forgets    Review of Systems  Constitutional, neuro, ENT, endocrine, pulmonary, cardiac, gastrointestinal, genitourinary, musculoskeletal, integument and psychiatric systems are negative, except as otherwise noted.    Patient Active Problem List    Diagnosis Date Noted     Tear of medial meniscus of left knee, current, unspecified tear type, subsequent encounter 2021     Priority: Medium     Chronic pain of left knee 2021     Priority: Medium     Other migraine without status migrainosus, not intractable 2017     Priority: Medium     Anxiety 2017     Priority: Medium     History of  section 2015     Priority: Medium      History reviewed. No pertinent past medical history.  Past Surgical History:   Procedure Laterality Date      SECTION  2015     Current Outpatient Medications   Medication Sig Dispense Refill     CALCIUM PO        citalopram (CELEXA) 20 MG tablet Take 1 tablet (20 mg) by mouth daily (Needs follow-up appointment for this medication) 90 tablet 3     IBUPROFEN PO Take by mouth as needed for moderate pain       Multiple Vitamins-Minerals (MULTIVITAMIN ADULT PO)          Allergies   Allergen Reactions     Penicillins         Social History     Tobacco Use     Smoking status: Former Smoker     Packs/day: 0.00     Years: 8.00     Pack years: 0.00     Quit date: 2011     Years since quitting: 10.6     Smokeless tobacco: Never Used   Substance Use Topics     Alcohol use: Yes     Alcohol/week: 0.0 standard drinks     Comment: rarely     Family History   Problem Relation Age of Onset     Diabetes Father      Hyperlipidemia Father      Diabetes Maternal Grandfather      Diabetes Paternal Grandmother      Hyperlipidemia Paternal Grandmother      Family History Negative Mother      Hyperlipidemia Mother      History   Drug Use No         Objective     /68   Pulse 89   Temp 97.7  F (36.5  C) (Tympanic)  "  Resp 16   Ht 1.727 m (5' 8\")   Wt 68 kg (150 lb)   SpO2 98%   BMI 22.81 kg/m      Physical Exam    GENERAL APPEARANCE: healthy, alert and no distress     EYES: EOMI, PERRL     HENT: ear canals and TM's normal and nose and mouth without ulcers or lesions     NECK: no adenopathy, no asymmetry, masses, or scars and thyroid normal to palpation     RESP: lungs clear to auscultation - no rales, rhonchi or wheezes     CV: regular rates and rhythm, normal S1 S2, no S3 or S4 and no murmur, click or rub     ABDOMEN:  soft, nontender, no HSM or masses and bowel sounds normal     MS: extremities normal- no gross deformities noted, no evidence of inflammation in joints, FROM in all extremities.     SKIN: no suspicious lesions or rashes     NEURO: Normal strength and tone, sensory exam grossly normal, mentation intact and speech normal     PSYCH: mentation appears normal. and affect normal/bright     LYMPHATICS: No cervical adenopathy    No results for input(s): HGB, PLT, INR, NA, POTASSIUM, CR, A1C in the last 17956 hours.     Diagnostics:  Labs pending at this time.  Results will be reviewed when available.   No EKG required for low risk surgery (cataract, skin procedure, breast biopsy, etc).    Revised Cardiac Risk Index (RCRI):  The patient has the following serious cardiovascular risks for perioperative complications:   - No serious cardiac risks = 0 points     RCRI Interpretation: 0 points: Class I (very low risk - 0.4% complication rate)           Signed Electronically by: Shanta Middleton DO  Copy of this evaluation report is provided to requesting physician.        "

## 2021-09-19 ENCOUNTER — HEALTH MAINTENANCE LETTER (OUTPATIENT)
Age: 36
End: 2021-09-19

## 2021-09-28 ENCOUNTER — TRANSFERRED RECORDS (OUTPATIENT)
Dept: HEALTH INFORMATION MANAGEMENT | Facility: CLINIC | Age: 36
End: 2021-09-28

## 2021-09-29 ENCOUNTER — TRANSCRIBE ORDERS (OUTPATIENT)
Dept: OTHER | Age: 36
End: 2021-09-29

## 2021-09-29 DIAGNOSIS — Z98.890 S/P MEDIAL MENISCUS REPAIR OF LEFT KNEE: Primary | ICD-10-CM

## 2021-10-01 ENCOUNTER — HOSPITAL ENCOUNTER (OUTPATIENT)
Dept: PHYSICAL THERAPY | Facility: CLINIC | Age: 36
Setting detail: THERAPIES SERIES
End: 2021-10-01
Attending: ORTHOPAEDIC SURGERY
Payer: COMMERCIAL

## 2021-10-01 DIAGNOSIS — Z98.890 S/P MEDIAL MENISCUS REPAIR OF LEFT KNEE: ICD-10-CM

## 2021-10-01 PROCEDURE — 97110 THERAPEUTIC EXERCISES: CPT | Mod: GP | Performed by: PHYSICAL THERAPIST

## 2021-10-01 PROCEDURE — 97116 GAIT TRAINING THERAPY: CPT | Mod: GP | Performed by: PHYSICAL THERAPIST

## 2021-10-01 PROCEDURE — 97161 PT EVAL LOW COMPLEX 20 MIN: CPT | Mod: GP | Performed by: PHYSICAL THERAPIST

## 2021-10-01 NOTE — PROGRESS NOTES
10/01/21 0900   General Information   Type of Visit Initial OP Ortho PT Evaluation   Start of Care Date 10/01/21   Referring Physician Azam Chowdhury   Patient/Family Goals Statement working out, walking the dog, wants it to heal correctly.    Orders Evaluate and Treat   Date of Order 09/28/21   Medical Diagnosis s/p left knee medial mensiscus repair and meniscal debridement, left knee chrondroplasty of an area measuring 1 cm x 1 cm in the trochlear groove DOS 9/16/21   Surgical/Medical history reviewed Yes   Precautions/Limitations no known precautions/limitations   General Information Comments PMH: none   Body Part(s)   Body Part(s) Knee   Presentation and Etiology   Pertinent history of current problem (include personal factors and/or comorbidities that impact the POC) Had meniscus surgery on 9/16. Saw the  on the 28th and he said she could start putting weight on it with and without crutches. Now that she has been putting some weight on it, it has been very sore. Icing regularly at home. Has been sleeping with the brace and elevating it.    Impairments A. Pain;C. Swelling;E. Decreased flexibility;F. Decreased strength and endurance   Functional Limitations perform activities of daily living;perform required work activities;perform desired leisure / sports activities   Symptom Location left knee   How/Where did it occur   (post op)   Onset date of current episode/exacerbation 09/16/21  (date of surgery )   Chronicity New   Pain rating (0-10 point scale) Best (/10);Worst (/10)   Best (/10) 0   Worst (/10) 7   Pain quality C. Aching   Frequency of pain/symptoms B. Intermittent   Pain/symptoms are: Worse during the day   Pain/symptoms exacerbated by B. Walking;G. Certain positions;K. Home tasks   Pain/symptoms eased by A. Sitting;C. Rest;E. Changing positions;H. Cold   Progression of symptoms since onset: Other  (worse these past few days )   Current Level of Function   Patient role/employment history A.  Pt lifted on heavy motor 3 weeks ago.  Has been having pain to left lower back down left leg   Employed   Fall Risk Screen   Fall screen completed by PT   Have you fallen 2 or more times in the past year? No   Have you fallen and had an injury in the past year? No   Is patient a fall risk? No   Abuse Screen (yes response referral indicated)   Feels Unsafe at Home or Work/School no   Feels Threatened by Someone no   Does Anyone Try to Keep You From Having Contact with Others or Doing Things Outside Your Home? no   Physical Signs of Abuse Present no   Functional Scales   Functional Scales Other   Other Scales  LEFS   Knee Objective Findings   Side (if bilateral, select both right and left) Left   Gait/Locomotion presents with 1 crutch in Left hand and knee brace on L side, antalgic gait patient complaining walking is very uncomofrtable    Left Knee Extension AROM 3 from 0    Left Knee Flexion AROM 77  (sitting)   Palpation mild tenderness to palpation across inferior patellar/incision area, mild swelling in this area as well.    Accessory Motion/Joint Mobility patellar mobility medial/lateral glide WNL L    Left Knee Extension PROM 6 from 0    Observation wearing knee brace, complains it always falls down, unlocked to 90 degrees    Integumentary  2 small incision across anterior knee les than 1/4 inch healing well, no signs of infection, mild bruising/discoloration along medial knee but very mild.    Left Quad Set Strength good isometric contraction but fatigues after 10 reps    Knee/Hip Strength Comments able to complete 10 SLR without lagbut fatigues   Planned Therapy Interventions   Planned Therapy Interventions balance training;bed mobility training;gait training;joint mobilization;manual therapy;neuromuscular re-education;ROM;strengthening;stretching;transfer training   Clinical Impression   Criteria for Skilled Therapeutic Interventions Met yes, treatment indicated   PT Diagnosis decreased L knee ROM    Influenced by the following impairments pain, swelling, decreased knee ROM, decreased knee strength     Functional limitations due to impairments walking, bending, stairs, daily chores, hobbies, running    Clinical Presentation Stable/Uncomplicated   Clinical Presentation Rationale clinical decision making and chart review    Clinical Decision Making (Complexity) Low complexity   Therapy Frequency 1 time/week   Predicted Duration of Therapy Intervention (days/wks) 10 weeks   Risk & Benefits of therapy have been explained Yes   Patient, Family & other staff in agreement with plan of care Yes   Clinical Impression Comments Jonna Pritchard is a 36 year old female who presents s/p L medial meniscla repair and debridement on 9/16/21.    Education Assessment   Preferred Learning Style Listening;Demonstration;Pictures/video   Barriers to Learning No barriers   ORTHO GOALS   PT Ortho Eval Goals 1;2;3;4;5   Ortho Goal 1   Goal Identifier 1   Goal Description Patient will be able to ambulate with non antalgic gait pattern with no AD.    Target Date 10/29/21   Ortho Goal 2   Goal Identifier 2   Goal Description Patient will be independent in her HEP in order to continue strengthening outside of PT>    Target Date 10/29/21   Ortho Goal 3   Goal Identifier 3   Goal Description Patient will improve left knee ROM to 0-130 in order to normalize movement for all daily activities.    Target Date 12/10/21   Ortho Goal 4   Goal Identifier 4   Goal Description Patient will be able to ascend/descend a flight of stairs with reciprocal gait pattern and no railing with pain staying below a 2/10.    Target Date 12/10/21   Ortho Goal 5   Goal Identifier 5   Goal Description Patient will be able to walk for 20 minutes with no increase in knee pain on the L.    Target Date 12/10/21   Total Evaluation Time   PT Eval, Low Complexity Minutes (20796) 15       Julia Andersen  PT, DPT       10/1/2021   [address]   [address] [place] [id number]  [email or web address]  Voicemail:  [phone]

## 2021-10-08 ENCOUNTER — HOSPITAL ENCOUNTER (OUTPATIENT)
Dept: PHYSICAL THERAPY | Facility: CLINIC | Age: 36
Setting detail: THERAPIES SERIES
End: 2021-10-08
Attending: ORTHOPAEDIC SURGERY
Payer: COMMERCIAL

## 2021-10-08 PROCEDURE — 97110 THERAPEUTIC EXERCISES: CPT | Mod: GP | Performed by: PHYSICAL THERAPIST

## 2021-10-08 PROCEDURE — 97116 GAIT TRAINING THERAPY: CPT | Mod: GP | Performed by: PHYSICAL THERAPIST

## 2021-10-15 ENCOUNTER — HOSPITAL ENCOUNTER (OUTPATIENT)
Dept: PHYSICAL THERAPY | Facility: CLINIC | Age: 36
Setting detail: THERAPIES SERIES
End: 2021-10-15
Attending: ORTHOPAEDIC SURGERY
Payer: COMMERCIAL

## 2021-10-15 PROCEDURE — 97110 THERAPEUTIC EXERCISES: CPT | Mod: GP | Performed by: PHYSICAL THERAPIST

## 2021-10-22 ENCOUNTER — HOSPITAL ENCOUNTER (OUTPATIENT)
Dept: PHYSICAL THERAPY | Facility: CLINIC | Age: 36
Setting detail: THERAPIES SERIES
End: 2021-10-22
Attending: ORTHOPAEDIC SURGERY
Payer: COMMERCIAL

## 2021-10-22 PROCEDURE — 97110 THERAPEUTIC EXERCISES: CPT | Mod: GP | Performed by: PHYSICAL THERAPIST

## 2021-10-22 NOTE — PROGRESS NOTES
Outpatient Physical Therapy Progress Note     Patient: Jonna Pritchard  : 1985    Beginning/End Dates of Reporting Period:  10/1/21 to 10/22/21    Referring Provider: Azam Mayo Diagnosis: decreased left knee ROM     Client Self Report: Has been walking without locking the brace now and the knee has been doing well. No buckling or soreness in the knee. Has even been walking a few steps without the brace on if it's short distances in her house without any issues. Exercises going well getting easier by the week.     Objective Measurements:  Objective Measure: knee ROM   Details: 0-93      Goals:  Goal Identifier 1   Goal Description Patient will be able to ambulate with non antalgic gait pattern with no AD.    Target Date 10/29/21   Date Met      Progress (detail required for progress note):  Progressing, mild disturbance in gait due to brace always falling down)      Goal Identifier 2   Goal Description Patient will be independent in her HEP in order to continue strengthening outside of PT>    Target Date 10/29/21   Date Met  10/22/21   Progress (detail required for progress note):       Goal Identifier 3   Goal Description Patient will improve left knee ROM to 0-130 in order to normalize movement for all daily activities.    Target Date 12/10/21   Date Met      Progress (detail required for progress note):  Limited to 90 degrees due to protocol      Goal Identifier 4   Goal Description Patient will be able to ascend/descend a flight of stairs with reciprocal gait pattern and no railing with pain staying below a 2/10.    Target Date 12/10/21   Date Met      Progress (detail required for progress note):       Goal Identifier 5   Goal Description Patient will be able to walk for 20 minutes with no increase in knee pain on the L.    Target Date 12/10/21   Date Met      Progress (detail required for progress note):       Assessment: Strength improving well with no issues. Mild achiness in the  knee reported if she does too much activity but pain has been well controlled. She has been walking with the brace unlocked and set to 90 degrees. Waiting on f/u with  To progress ROM past 90 degrees.     Plan:  Continue therapy per current plan of care.    Discharge:  Lela Andersen  PT, DPT       10/22/2021   [address] [address]  [email or web address]  Voicemail: [phone]

## 2021-10-26 ENCOUNTER — TRANSFERRED RECORDS (OUTPATIENT)
Dept: HEALTH INFORMATION MANAGEMENT | Facility: CLINIC | Age: 36
End: 2021-10-26
Payer: COMMERCIAL

## 2021-10-29 ENCOUNTER — HOSPITAL ENCOUNTER (OUTPATIENT)
Dept: PHYSICAL THERAPY | Facility: CLINIC | Age: 36
Setting detail: THERAPIES SERIES
End: 2021-10-29
Attending: ORTHOPAEDIC SURGERY
Payer: COMMERCIAL

## 2021-10-29 PROCEDURE — 97110 THERAPEUTIC EXERCISES: CPT | Mod: GP | Performed by: PHYSICAL THERAPIST

## 2021-11-05 ENCOUNTER — HOSPITAL ENCOUNTER (OUTPATIENT)
Dept: PHYSICAL THERAPY | Facility: CLINIC | Age: 36
Setting detail: THERAPIES SERIES
End: 2021-11-05
Attending: ORTHOPAEDIC SURGERY
Payer: COMMERCIAL

## 2021-11-05 PROCEDURE — 97110 THERAPEUTIC EXERCISES: CPT | Mod: GP | Performed by: PHYSICAL THERAPIST

## 2021-11-15 ENCOUNTER — HOSPITAL ENCOUNTER (OUTPATIENT)
Dept: PHYSICAL THERAPY | Facility: CLINIC | Age: 36
Setting detail: THERAPIES SERIES
End: 2021-11-15
Attending: ORTHOPAEDIC SURGERY
Payer: COMMERCIAL

## 2021-11-15 PROCEDURE — 97110 THERAPEUTIC EXERCISES: CPT | Mod: GP | Performed by: PHYSICAL THERAPIST

## 2021-11-24 ENCOUNTER — HOSPITAL ENCOUNTER (OUTPATIENT)
Dept: PHYSICAL THERAPY | Facility: CLINIC | Age: 36
Setting detail: THERAPIES SERIES
End: 2021-11-24
Attending: ORTHOPAEDIC SURGERY
Payer: COMMERCIAL

## 2021-11-24 PROCEDURE — 97110 THERAPEUTIC EXERCISES: CPT | Mod: GP | Performed by: PHYSICAL THERAPIST

## 2021-11-30 ENCOUNTER — HOSPITAL ENCOUNTER (OUTPATIENT)
Dept: PHYSICAL THERAPY | Facility: CLINIC | Age: 36
Setting detail: THERAPIES SERIES
End: 2021-11-30
Attending: ORTHOPAEDIC SURGERY
Payer: COMMERCIAL

## 2021-11-30 PROCEDURE — 97110 THERAPEUTIC EXERCISES: CPT | Mod: GP | Performed by: PHYSICAL THERAPIST

## 2021-12-10 ENCOUNTER — HOSPITAL ENCOUNTER (OUTPATIENT)
Dept: PHYSICAL THERAPY | Facility: CLINIC | Age: 36
Setting detail: THERAPIES SERIES
End: 2021-12-10
Attending: ORTHOPAEDIC SURGERY
Payer: COMMERCIAL

## 2021-12-10 PROCEDURE — 97110 THERAPEUTIC EXERCISES: CPT | Mod: GP | Performed by: PHYSICAL THERAPIST

## 2021-12-10 NOTE — PROGRESS NOTES
"[facility]    Outpatient Physical Therapy Progress Note  Patient: Jonna Pritchard  : 1985    Beginning/End Dates of Reporting Period:  10/1/21 to 12/10/21    Referring Provider: Azam Mayo Diagnosis: decreased left knee ROM     Client Self Report: Everything is going great. No complaints. Tightness really has improved this past week and not noticing any pulling in the knee with any activity. Walked over 20,000 steps for work this past week a few days and had no issues in the knee     Objective Measurements:  Objective Measure: knee ROM   Details: 0-140 (mild pulling along lateral side of knee)   Objective Measure: left LE strength   Details: hip abd: 4+/5, knee flex 5-/5, knee ext 5-/5   Objective Measure: gait   Details: normal, non antalgic       Goals:  Goal Identifier 1   Goal Description Patient will be able to ambulate with non antalgic gait pattern with no AD.    Target Date 10/29/21   Date Met  11/15/21   Progress (detail required for progress note):       Goal Identifier 2   Goal Description Patient will be independent in her HEP in order to continue strengthening outside of PT>    Target Date 10/29/21   Date Met  10/22/21   Progress (detail required for progress note):       Goal Identifier 3   Goal Description Patient will improve left knee ROM to 0-130 in order to normalize movement for all daily activities.    Target Date 12/10/21   Date Met  12/10/21   Progress (detail required for progress note): tightness right at end range in knee flexion      Goal Identifier 4   Goal Description Patient will be able to ascend/descend a flight of stairs with reciprocal gait pattern and no railing with pain staying below a 2/10.    Target Date 12/10/21   Date Met  21   Progress (detail required for progress note): descending 2\" step slowly causes shaking but can ascend/descend " "stairs without issues      Goal Identifier 5   Goal Description Patient will be able to walk for 20 minutes with no increase in knee pain on the L.    Target Date 12/10/21   Date Met  11/15/21   Progress (detail required for progress note): [place] the [place]      Goal Identifier 6   Goal Description Patient will be able to complete 6\" step down with no valgus collapse in left knee in order to demonstrate good quad and hip abd control for stairs   Target Date 12/24/21 (new goal on 12/10/21)   Date Met      Progress (detail required for progress note):         Plan:  Other: one more PT visit and then discharge    Discharge:  Lela Andersen  PT, DPT       12/10/2021   [address] [address]  [email or web address]  Voicemail: [phone]         "

## 2021-12-21 ENCOUNTER — TRANSFERRED RECORDS (OUTPATIENT)
Dept: HEALTH INFORMATION MANAGEMENT | Facility: CLINIC | Age: 36
End: 2021-12-21
Payer: COMMERCIAL

## 2021-12-22 ENCOUNTER — HOSPITAL ENCOUNTER (OUTPATIENT)
Dept: PHYSICAL THERAPY | Facility: CLINIC | Age: 36
Setting detail: THERAPIES SERIES
End: 2021-12-22
Attending: ORTHOPAEDIC SURGERY
Payer: COMMERCIAL

## 2021-12-22 PROCEDURE — 97110 THERAPEUTIC EXERCISES: CPT | Mod: GP | Performed by: PHYSICAL THERAPIST

## 2021-12-22 NOTE — PROGRESS NOTES
Addendum: 2022   This note acts as a discharge note as patient did not need to return for continued therapy. At last attended visit when progress note was written, patient was meeting all their goals and the plan was to discharge if no new symptoms arose within 30 days.                                                                                  [facility] Rehabilitation [facility]    Outpatient Physical Therapy Progress Note  Patient: Jonna Pritchard  : 1985     Beginning/End Dates of Reporting Period:  10/1/21 to 21     Referring Provider: Azam Mayo Diagnosis: decreased left knee ROM      Client Self Report: NO issues with the knee. had her f/u with the surgeon and he said she was good to go and didn't need to f/u up with him anymore. Gave her the okay to be done with PT so long as PT said it was okay. Patient feels she has returned to everything she was doing before with no issues.     Objective Measurements:  Objective Measure: knee ROM   Details: 0-140 (mild pulling along lateral side of knee)   Objective Measure: left LE strength   Details: hip abd: 4+/5, knee flex 5-/5, knee ext 5-/5   Objective Measure: gait   Details: normal, non antalgic       Goals:  Goal Identifier 1   Goal Description Patient will be able to ambulate with non antalgic gait pattern with no AD.    Target Date 10/29/21   Date Met  11/15/21   Progress (detail required for progress note):       Goal Identifier 2   Goal Description Patient will be independent in her HEP in order to continue strengthening outside of PT>    Target Date 10/29/21   Date Met  10/22/21   Progress (detail required for progress note):       Goal Identifier 3   Goal Description Patient will improve left knee ROM to 0-130 in order to normalize movement for all daily activities.    Target Date 12/10/21   Date Met  12/10/21   Progress (detail required for progress note): tightness right at end range in knee flexion      Goal  "Identifier 4   Goal Description Patient will be able to ascend/descend a flight of stairs with reciprocal gait pattern and no railing with pain staying below a 2/10.    Target Date 12/10/21   Date Met  11/30/21   Progress (detail required for progress note): descending 2\" step slowly causes shaking but can ascend/descend stairs without issues      Goal Identifier 5   Goal Description Patient will be able to walk for 20 minutes with no increase in knee pain on the L.    Target Date 12/10/21   Date Met  11/15/21   Progress (detail required for progress note): [place] the zoo [place]      Goal Identifier 6   Goal Description Patient will be able to complete 6\" step down with no valgus collapse in left knee in order to demonstrate good quad and hip abd control for stairs   Target Date 12/24/21 (new goal )   Date Met  12/22/21   Progress (detail required for progress note):         Plan:  Discharge from therapy in 30 days.     Discharge:    Reason for Discharge: Patient has met all goals.    Equipment Issued: theraband    Discharge Plan: Patient to continue home program 3-4 times per week and only f/u if issues arise in the next 30 days.       Julia Andersen  PT, DPT       12/22/2021   [facility]  [address]   [address] [place] [id number]  [email or web address]  Voicemail: [phone]         "

## 2022-01-09 ENCOUNTER — HEALTH MAINTENANCE LETTER (OUTPATIENT)
Age: 37
End: 2022-01-09

## 2022-03-08 ENCOUNTER — E-VISIT (OUTPATIENT)
Dept: PEDIATRICS | Facility: CLINIC | Age: 37
End: 2022-03-08
Payer: COMMERCIAL

## 2022-03-08 DIAGNOSIS — N39.0 ACUTE UTI (URINARY TRACT INFECTION): Primary | ICD-10-CM

## 2022-03-08 PROCEDURE — 99422 OL DIG E/M SVC 11-20 MIN: CPT | Performed by: PREVENTIVE MEDICINE

## 2022-03-08 RX ORDER — NITROFURANTOIN 25; 75 MG/1; MG/1
100 CAPSULE ORAL 2 TIMES DAILY
Qty: 10 CAPSULE | Refills: 0 | Status: SHIPPED | OUTPATIENT
Start: 2022-03-08 | End: 2022-03-13

## 2022-03-08 NOTE — PATIENT INSTRUCTIONS
Dear Jonna Pritchard    After reviewing your responses, I've been able to diagnose you with a urinary tract infection, which is a common infection of the bladder with bacteria.  This is not a sexually transmitted infection, though urinating immediately after intercourse can help prevent infections.  Drinking lots of fluids is also helpful to clear your current infection and prevent the next one.      I have sent a prescription for antibiotics to your pharmacy to treat this infection.    It is important that you take all of your prescribed medication even if your symptoms are improving after a few doses.  Taking all of your medicine helps prevent the symptoms from returning.     If your symptoms worsen, you develop pain in your back or stomach, develop fevers, or are not improving in 5 days, please contact your primary care provider for an appointment or visit any of our convenient Walk-in or Urgent Care Centers to be seen, which can be found on our website here.    Thanks again for choosing us as your health care partner,    Peterson Pascual MD, MD    Urinary Tract Infections in Women  Urinary tract infections (UTIs) are most often caused by bacteria. These bacteria enter the urinary tract. The bacteria may come from inside the body. Or they may travel from the skin outside the rectum or vagina into the urethra. Female anatomy makes it easy for bacteria from the bowel to enter a woman s urinary tract, which is the most common source of UTI. This means women develop UTIs more often than men. Pain in or around the urinary tract is a common UTI symptom. But the only way to know for sure if you have a UTI for the healthcare provider to test your urine. The two tests that may be done are the urinalysis and urine culture.     Types of UTIs    Cystitis. A bladder infection (cystitis) is the most common UTI in women. You may have urgent or frequent need to pee. You may also have pain, burning when you pee,  and bloody urine.    Urethritis. This is an inflamed urethra, which is the tube that carries urine from the bladder to outside the body. You may have lower stomach or back pain. You may also have urgent or frequent need to pee.    Pyelonephritis. This is a kidney infection. If not treated, it can be serious and damage your kidneys. In severe cases, you may need to stay in the hospital. You may have a fever and lower back pain.    Medicines to treat a UTI  Most UTIs are treated with antibiotics. These kill the bacteria. The length of time you need to take them depends on the type of infection. It may be as short as 3 days. If you have repeated UTIs, you may need a low-dose antibiotic for several months. Take antibiotics exactly as directed. Don t stop taking them until all of the medicine is gone. If you stop taking the antibiotic too soon, the infection may not go away. You may also develop a resistance to the antibiotic. This can make it much harder to treat.   Lifestyle changes to treat and prevent UTIs   The lifestyle changes below will help get rid of your UTI. They may also help prevent future UTIs.     Drink plenty of fluids. This includes water, juice, or other caffeine-free drinks. Fluids help flush bacteria out of your body.    Empty your bladder. Always empty your bladder when you feel the urge to pee. And always pee before going to sleep. Urine that stays in your bladder can lead to infection. Try to pee before and after sex as well.    Practice good personal hygiene. Wipe yourself from front to back after using the toilet. This helps keep bacteria from getting into the urethra.    Use condoms during sex. These help prevent UTIs caused by sexually transmitted bacteria. Also don't use spermicides during sex. These can increase the risk for UTIs. Choose other forms of birth control instead. For women who tend to get UTIs after sex, a low-dose of a preventive antibiotic may be used. Be sure to discuss this  option with your healthcare provider.    Follow up with your healthcare provider as directed. He or she may test to make sure the infection has cleared. If needed, more treatment may be started.  Breann last reviewed this educational content on 7/1/2019 2000-2021 [employer]. All rights reserved. This information is not intended as a substitute for professional medical care. Always follow your healthcare professional's instructions.

## 2022-04-04 ENCOUNTER — OFFICE VISIT (OUTPATIENT)
Dept: FAMILY MEDICINE | Facility: CLINIC | Age: 37
End: 2022-04-04
Payer: COMMERCIAL

## 2022-04-04 VITALS
BODY MASS INDEX: 22.81 KG/M2 | OXYGEN SATURATION: 99 % | DIASTOLIC BLOOD PRESSURE: 60 MMHG | SYSTOLIC BLOOD PRESSURE: 108 MMHG | TEMPERATURE: 97.7 F | RESPIRATION RATE: 16 BRPM | HEIGHT: 69 IN | HEART RATE: 70 BPM | WEIGHT: 154 LBS

## 2022-04-04 DIAGNOSIS — R51.9 NONINTRACTABLE HEADACHE, UNSPECIFIED CHRONICITY PATTERN, UNSPECIFIED HEADACHE TYPE: Primary | ICD-10-CM

## 2022-04-04 PROCEDURE — 99213 OFFICE O/P EST LOW 20 MIN: CPT | Performed by: NURSE PRACTITIONER

## 2022-04-04 ASSESSMENT — ENCOUNTER SYMPTOMS: HEADACHES: 1

## 2022-04-04 ASSESSMENT — PAIN SCALES - GENERAL: PAINLEVEL: SEVERE PAIN (6)

## 2022-04-04 NOTE — PATIENT INSTRUCTIONS
Try Ibuprofen 600 mg three times a day with food for the next 3 days.  Seek emergent care if worsening.  Notify if no improvement and could try a triptan or muscle relaxant.      Our Clinic hours are:  Mondays    7:20 am - 7 pm  Tues -  Fri  7:20 am - 5 pm    Clinic Phone: [phone]    The clinic lab opens at 7:30 am Mon - Fri and appointments are required.    [facility]  Ph. [phone]  Monday  8 am - 7pm  Tues - Fri 8 am - 5:30 pm

## 2022-04-04 NOTE — PROGRESS NOTES
"  Assessment & Plan     Nonintractable headache, unspecified chronicity pattern, unspecified headache type    Will try increasing ibuprofen for now. Reviewed worrisome findings and to seek emergent care.  She declined muscle relaxants for now.             See Patient Instructions  Patient Instructions   Try Ibuprofen 600 mg three times a day with food for the next 3 days.  Seek emergent care if worsening.  Notify if no improvement and could try a triptan or muscle relaxant.      Our Clinic hours are:  Mondays    7:20 am - 7 pm  Tues -  Fri  7:20 am - 5 pm    Clinic Phone: [phone]    The clinic lab opens at 7:30 am Mon - Fri and appointments are required.    [facility] [place]  Ph. [phone]  Monday  8 am - 7pm  Tues - Fri 8 am - 5:30 pm             Return in about 1 week (around 4/11/2022) for or sooner if symptoms persist or worsen.    [facility]    Maxi Coyle is a 36 year old who presents for the following health issues       Chief Complaint   Patient presents with     Headache     patient hit her head on 4/2/22 on her door frame at home .       Headache     History of Present Illness       Migraines:   Since the patient's last clinic visit, headaches are: worsened  The patient is getting headaches:  Since Saturday  She is able to do normal daily activities when she has a migraine.  The patient is taking the following rescue/relief medications:  Ibuprofen (Advil, Motrin)   Patient states \"I get some relief\" from the rescue/relief medications.   The patient is taking the following medications to prevent migraines:  No medications to prevent migraines  In the past 4 weeks, the patient has gone to an Urgent Care or Emergency Room 0 times times due to headaches.    She eats 0-1 servings of fruits and vegetables daily.She consumes 1 sweetened beverage(s) daily.She exercises with enough effort to increase her heart rate 10 to 19 minutes " per day.  She exercises with enough effort to increase her heart rate 3 or less days per week. She is missing 1 dose(s) of medications per week.  She is not taking prescribed medications regularly due to remembering to take.       She was bathing dog and holding leash while dog pulled her forward and she hit her forehead on door frame.  Denies dizziness, vision changes, nausea. States she just has this persistent dull frontal headache. Has been trying ibuprofen 400 mg with little help. States she used to have migraines, those have gone away after staring Celexa.       Review of Systems   Neurological: Positive for headaches.      Constitutional, HEENT, cardiovascular, pulmonary, gi and gu systems are negative, except as otherwise noted.      Objective    There were no vitals taken for this visit.  There is no height or weight on file to calculate BMI.  Physical Exam   GENERAL: healthy, alert and no distress  HENT: ear canals and TM's normal, pharynx without erythema  NECK: no adenopathy, no asymmetry, FROM of neck, tight trapezoids  RESP: lungs clear to auscultation - no rales, rhonchi or wheezes  CV: regular rate and rhythm, normal S1 S2, no S3 or S4, no murmur  MS: no gross musculoskeletal defects noted  Neuro: cranial nerves grossly intact, no focal findings, negative Romberg's

## 2022-07-17 DIAGNOSIS — F41.9 ANXIETY: ICD-10-CM

## 2022-07-18 RX ORDER — CITALOPRAM HYDROBROMIDE 20 MG/1
TABLET ORAL
Qty: 90 TABLET | Refills: 0 | Status: SHIPPED | OUTPATIENT
Start: 2022-07-18 | End: 2022-09-13

## 2022-07-18 NOTE — TELEPHONE ENCOUNTER
No PCP ID'ed;  Short refill given, needs to designate PCP and have follow-up visit for ongoing refills

## 2022-09-11 DIAGNOSIS — F41.9 ANXIETY: ICD-10-CM

## 2022-09-13 RX ORDER — CITALOPRAM HYDROBROMIDE 20 MG/1
TABLET ORAL
Qty: 90 TABLET | Refills: 0 | Status: SHIPPED | OUTPATIENT
Start: 2022-09-13 | End: 2022-11-02

## 2022-09-13 NOTE — TELEPHONE ENCOUNTER
Pending Prescriptions:                       Disp   Refills    citalopram (CELEXA) 20 MG tablet [Pharmac*90 tab*0            Sig: TAKE 1 TABLET BY MOUTH  DAILY    Routing refill request to provider for review/approval because:  Drug interaction warning    Pt has appt 9-28      Edwin Maldonado, RN

## 2022-10-04 ENCOUNTER — TELEPHONE (OUTPATIENT)
Dept: FAMILY MEDICINE | Facility: CLINIC | Age: 37
End: 2022-10-04

## 2022-10-04 NOTE — TELEPHONE ENCOUNTER
Patient Quality Outreach    Patient is due for the following:   Cervical Cancer Screening - PAP Needed  Physical Preventive Adult Physical    Next Steps:   Schedule a Adult Preventative    Type of outreach:    Sent [employer] message.      Questions for provider review:    None     LEIGH Mejía LPN

## 2022-11-02 ENCOUNTER — OFFICE VISIT (OUTPATIENT)
Dept: FAMILY MEDICINE | Facility: CLINIC | Age: 37
End: 2022-11-02
Payer: COMMERCIAL

## 2022-11-02 VITALS
TEMPERATURE: 97.5 F | SYSTOLIC BLOOD PRESSURE: 104 MMHG | HEART RATE: 73 BPM | BODY MASS INDEX: 22.6 KG/M2 | DIASTOLIC BLOOD PRESSURE: 70 MMHG | WEIGHT: 152.6 LBS | OXYGEN SATURATION: 99 % | HEIGHT: 69 IN | RESPIRATION RATE: 16 BRPM

## 2022-11-02 DIAGNOSIS — Z00.00 ROUTINE GENERAL MEDICAL EXAMINATION AT A HEALTH CARE FACILITY: Primary | ICD-10-CM

## 2022-11-02 DIAGNOSIS — Z12.4 CERVICAL CANCER SCREENING: ICD-10-CM

## 2022-11-02 DIAGNOSIS — Z13.6 CARDIOVASCULAR SCREENING; LDL GOAL LESS THAN 100: ICD-10-CM

## 2022-11-02 DIAGNOSIS — Z13.1 SCREENING FOR DIABETES MELLITUS: ICD-10-CM

## 2022-11-02 DIAGNOSIS — F41.9 ANXIETY: ICD-10-CM

## 2022-11-02 LAB
CHOLEST SERPL-MCNC: 199 MG/DL
FASTING STATUS PATIENT QL REPORTED: YES
GLUCOSE SERPL-MCNC: 75 MG/DL (ref 70–99)
HDLC SERPL-MCNC: 56 MG/DL
LDLC SERPL CALC-MCNC: 130 MG/DL
NONHDLC SERPL-MCNC: 143 MG/DL
TRIGL SERPL-MCNC: 63 MG/DL

## 2022-11-02 PROCEDURE — 36415 COLL VENOUS BLD VENIPUNCTURE: CPT | Performed by: INTERNAL MEDICINE

## 2022-11-02 PROCEDURE — G0145 SCR C/V CYTO,THINLAYER,RESCR: HCPCS | Performed by: INTERNAL MEDICINE

## 2022-11-02 PROCEDURE — 80061 LIPID PANEL: CPT | Performed by: INTERNAL MEDICINE

## 2022-11-02 PROCEDURE — 82947 ASSAY GLUCOSE BLOOD QUANT: CPT | Performed by: INTERNAL MEDICINE

## 2022-11-02 PROCEDURE — 87624 HPV HI-RISK TYP POOLED RSLT: CPT | Performed by: INTERNAL MEDICINE

## 2022-11-02 PROCEDURE — 99395 PREV VISIT EST AGE 18-39: CPT | Performed by: INTERNAL MEDICINE

## 2022-11-02 PROCEDURE — 99213 OFFICE O/P EST LOW 20 MIN: CPT | Mod: 25 | Performed by: INTERNAL MEDICINE

## 2022-11-02 RX ORDER — CITALOPRAM HYDROBROMIDE 20 MG/1
20 TABLET ORAL DAILY
Qty: 90 TABLET | Refills: 3 | Status: SHIPPED | OUTPATIENT
Start: 2022-11-02 | End: 2023-11-13

## 2022-11-02 ASSESSMENT — ENCOUNTER SYMPTOMS
MYALGIAS: 0
HEMATURIA: 0
NAUSEA: 0
SORE THROAT: 0
DYSURIA: 0
PARESTHESIAS: 0
CONSTIPATION: 0
ABDOMINAL PAIN: 0
BREAST MASS: 0
HEARTBURN: 0
HEADACHES: 0
EYE PAIN: 0
SHORTNESS OF BREATH: 0
WEAKNESS: 0
FREQUENCY: 1
ARTHRALGIAS: 0
FEVER: 0
JOINT SWELLING: 0
COUGH: 0
PALPITATIONS: 0
NERVOUS/ANXIOUS: 0
CHILLS: 0
HEMATOCHEZIA: 0
DIZZINESS: 0
DIARRHEA: 0

## 2022-11-02 ASSESSMENT — PATIENT HEALTH QUESTIONNAIRE - PHQ9
5. POOR APPETITE OR OVEREATING: NOT AT ALL
SUM OF ALL RESPONSES TO PHQ QUESTIONS 1-9: 2

## 2022-11-02 ASSESSMENT — ANXIETY QUESTIONNAIRES
IF YOU CHECKED OFF ANY PROBLEMS ON THIS QUESTIONNAIRE, HOW DIFFICULT HAVE THESE PROBLEMS MADE IT FOR YOU TO DO YOUR WORK, TAKE CARE OF THINGS AT HOME, OR GET ALONG WITH OTHER PEOPLE: NOT DIFFICULT AT ALL
6. BECOMING EASILY ANNOYED OR IRRITABLE: NOT AT ALL
2. NOT BEING ABLE TO STOP OR CONTROL WORRYING: NOT AT ALL
1. FEELING NERVOUS, ANXIOUS, OR ON EDGE: NOT AT ALL
7. FEELING AFRAID AS IF SOMETHING AWFUL MIGHT HAPPEN: NOT AT ALL
GAD7 TOTAL SCORE: 0
3. WORRYING TOO MUCH ABOUT DIFFERENT THINGS: NOT AT ALL
5. BEING SO RESTLESS THAT IT IS HARD TO SIT STILL: NOT AT ALL
GAD7 TOTAL SCORE: 0

## 2022-11-02 ASSESSMENT — PAIN SCALES - GENERAL: PAINLEVEL: NO PAIN (0)

## 2022-11-02 NOTE — RESULT ENCOUNTER NOTE
The LDL (bad cholesterol) is slightly high.  The goal is less than 130.  Overall the cholesterol numbers look good.  Recheck in 3-5 years.  The blood sugar is normal.

## 2022-11-02 NOTE — PROGRESS NOTES
SUBJECTIVE:   CC: Jonna is an 37 year old who presents for preventive health visit.       Patient has been advised of split billing requirements and indicates understanding: Yes  Healthy Habits:     Getting at least 3 servings of Calcium per day:  NO    Bi-annual eye exam:  Yes    Dental care twice a year:  Yes    Sleep apnea or symptoms of sleep apnea:  Daytime drowsiness    Diet:  Regular (no restrictions)    Frequency of exercise:  1 day/week    Duration of exercise:  15-30 minutes    Taking medications regularly:  Yes    Barriers to taking medications:  Not applicable    Medication side effects:  Not applicable    PHQ-2 Total Score: 0    Additional concerns today:  No       Chief Complaint   Patient presents with     Physical     Would like to get blood work cholesterol and diabetes this  runs in the [facility]      Health Maintenance     Due for covid flu pap dont want shots had covid shot all ready will bring card in next time             Anxiety Follow-Up    How are you doing with your anxiety since your last visit? No change    Are you having other symptoms that might be associated with anxiety? No    Have you had a significant life event? No     Are you feeling depressed? No    Do you have any concerns with your use of alcohol or other drugs? No     Has been on citalopram since  or so;  Has tried going off but it wasn't a good time with work stressors    Social History     Tobacco Use     Smoking status: Former     Packs/day: 0.00     Years: 8.00     Pack years: 0.00     Types: Cigarettes     Quit date: 2011     Years since quittin.8     Smokeless tobacco: Never   Substance Use Topics     Alcohol use: Yes     Alcohol/week: 0.0 standard drinks     Comment: rarely     Drug use: No     ABDOUL-7 SCORE 10/17/2017 2020 2022   Total Score 3 2 0     PHQ 10/17/2017 2020 2022   PHQ-9 Total Score 2 2 2   Q9: Thoughts of better off dead/self-harm past 2 weeks Not at all Not at all Not at  all     Last PHQ-9 11/2/2022   1.  Little interest or pleasure in doing things 0   2.  Feeling down, depressed, or hopeless 0   3.  Trouble falling or staying asleep, or sleeping too much 1   4.  Feeling tired or having little energy 1   5.  Poor appetite or overeating 0   6.  Feeling bad about yourself 0   7.  Trouble concentrating 0   8.  Moving slowly or restless 0   Q9: Thoughts of better off dead/self-harm past 2 weeks 0   PHQ-9 Total Score 2   Difficulty at work, home, or with people Not difficult at all     ABDOUL-7  11/2/2022   1. Feeling nervous, anxious, or on edge 0   2. Not being able to stop or control worrying 0   3. Worrying too much about different things 0   4. Trouble relaxing 0   5. Being so restless that it is hard to sit still 0   6. Becoming easily annoyed or irritable 0   7. Feeling afraid, as if something awful might happen 0   ABDOUL-7 Total Score 0   If you checked any problems, how difficult have they made it for you to do your work, take care of things at home, or get along with other people? Not difficult at all         Today's PHQ-2 Score:   PHQ-2 ( 1999 [employer]) 11/2/2022   Q1: Little interest or pleasure in doing things 0   Q2: Feeling down, depressed or hopeless 0   PHQ-2 Score 0   PHQ-2 Total Score (12-17 Years)- Positive if 3 or more points; Administer PHQ-A if positive -   Q1: Little interest or pleasure in doing things Not at all   Q2: Feeling down, depressed or hopeless Not at all   PHQ-2 Score 0       Abuse: Current or Past (Physical, Sexual or Emotional) - No  Do you feel safe in your environment? Yes    Have you ever done Advance Care Planning? (For example, a Health Directive, POLST, or a discussion with a medical provider or your loved ones about your wishes): No, advance care planning information given to patient to review.  Advanced care planning was discussed at today's visit.    Social History     Tobacco Use     Smoking status: Former     Packs/day: 0.00     Years: 8.00      Pack years: 0.00     Types: Cigarettes     Quit date: 2011     Years since quittin.8     Smokeless tobacco: Never   Substance Use Topics     Alcohol use: Yes     Alcohol/week: 0.0 standard drinks     Comment: rarely     If you drink alcohol do you typically have >3 drinks per day or >7 drinks per week? No    Alcohol Use 2022   Prescreen: >3 drinks/day or >7 drinks/week? No   Prescreen: >3 drinks/day or >7 drinks/week? -   No flowsheet data found.    Reviewed orders with patient.  Reviewed health maintenance and updated orders accordingly - Yes  Current Outpatient Medications   Medication Sig Dispense Refill     CALCIUM PO        citalopram (CELEXA) 20 MG tablet Take 1 tablet (20 mg) by mouth daily 90 tablet 3     IBUPROFEN PO Take by mouth as needed for moderate pain       Multiple Vitamins-Minerals (MULTIVITAMIN ADULT PO)          Breast Cancer Screening:  Any new diagnosis of family breast, ovarian, or bowel cancer? No    FHS-7: No flowsheet data found.    Patient under 40 years of age: Routine Mammogram Screening not recommended.   Pertinent mammograms are reviewed under the imaging tab.    History of abnormal Pap smear: NO - age 30-65 PAP every 5 years with negative HPV co-testing recommended  PAP / HPV Latest Ref Rng & Units 3/6/2017 2015   PAP - - Negative for squamous intraepithelial lesion or malignancy  Electronically signed by Enrique Srinivasan CT (ASCP) on 2015 at 10:50 AM     PAP (Historical) - NIL -   HPV16 NEG Negative -   HPV18 NEG Negative -   HRHPV NEG Negative -     Reviewed and updated as needed this visit by clinical staff   Tobacco  Allergies  Meds     Fam Hx  Soc Hx        Reviewed and updated as needed this visit by Provider                     Review of Systems   Constitutional: Negative for chills and fever.   HENT: Negative for congestion, ear pain, hearing loss and sore throat.    Eyes: Negative for pain and visual disturbance.   Respiratory: Negative for  "cough and shortness of breath.    Cardiovascular: Negative for chest pain, palpitations and peripheral edema.   Gastrointestinal: Negative for abdominal pain, constipation, diarrhea, heartburn, hematochezia and nausea.   Breasts:  Negative for tenderness, breast mass and discharge.   Genitourinary: Positive for frequency. Negative for dysuria, genital sores, hematuria, pelvic pain, urgency, vaginal bleeding and vaginal discharge.   Musculoskeletal: Negative for arthralgias, joint swelling and myalgias.   Skin: Negative for rash.   Neurological: Negative for dizziness, weakness, headaches and paresthesias.   Psychiatric/Behavioral: Negative for mood changes. The patient is not nervous/anxious.           OBJECTIVE:   /70 (BP Location: Right arm, Patient Position: Sitting, Cuff Size: Adult Regular)   Pulse 73   Temp 97.5  F (36.4  C) (Tympanic)   Resp 16   Ht 1.746 m (5' 8.74\")   Wt 69.2 kg (152 lb 9.6 oz)   LMP 10/12/2022 (Exact Date)   SpO2 99%   BMI 22.71 kg/m    Physical Exam  GENERAL: healthy, alert and no distress  EYES: Eyes grossly normal to inspection, PERRL and conjunctivae and sclerae normal  HENT: ear canals and TM's normal, nose and mouth without ulcers or lesions  NECK: no adenopathy, no asymmetry, masses, or scars and thyroid normal to palpation  RESP: lungs clear to auscultation - no rales, rhonchi or wheezes  CV: regular rate and rhythm, normal S1 S2, no S3 or S4, no murmur, click or rub, no peripheral edema and peripheral pulses strong  ABDOMEN: soft, nontender, no hepatosplenomegaly, no masses and bowel sounds normal   (female): normal female external genitalia, normal urethral meatus, vaginal mucosa pink, moist, well rugated, and normal cervix/adnexa/uterus without masses or discharge;  Narrow vaginal introitus, discomfort during exam  MS: no gross musculoskeletal defects noted, no edema  SKIN: no suspicious lesions or rashes  NEURO: Normal strength and tone, mentation intact and " "speech normal  PSYCH: mentation appears normal, affect normal/bright        ASSESSMENT/PLAN:   (Z00.00) Routine general medical examination at a health care facility  (primary encounter diagnosis)  Comment:   Plan:     (Z12.4) Cervical cancer screening  Comment:   Plan: Pap Screen with HPV - recommended age 30 - 65         years            (F41.9) Anxiety  Comment: patient opts to continue med unchanged.  If she decides to decrease dose, my chart message for discussion  Plan: citalopram (CELEXA) 20 MG tablet, OFFICE/OUTPT         VISIT,KATHY HOGANL III            (Z13.1) Screening for diabetes mellitus  Comment:   Plan: Glucose            (Z13.6) CARDIOVASCULAR SCREENING; LDL GOAL LESS THAN 100  Comment:   Plan: Lipid panel reflex to direct LDL Fasting              Patient has been advised of split billing requirements and indicates understanding: Yes      COUNSELING:  Reviewed preventive health counseling, as reflected in patient instructions    Estimated body mass index is 22.71 kg/m  as calculated from the following:    Height as of this encounter: 1.746 m (5' 8.74\").    Weight as of this encounter: 69.2 kg (152 lb 9.6 oz).        She reports that she quit smoking about 11 years ago. Her smoking use included cigarettes. She has never used smokeless tobacco.      Counseling Resources:  ATP IV Guidelines  Pooled Cohorts Equation Calculator  Breast Cancer Risk Calculator  BRCA-Related Cancer Risk Assessment: FHS-7 Tool  FRAX Risk Assessment  ICSI Preventive Guidelines  Dietary Guidelines for Americans, 2010  USDA's MyPlate  ASA Prophylaxis  Lung CA Screening    Shanta Middleton, DO  [facility]  "

## 2022-11-02 NOTE — PATIENT INSTRUCTIONS
Refill of celexa        Preventive Health Recommendations  Female Ages 26 - 39  Yearly exam:   See your health care provider every year in order to  Review health changes.   Discuss preventive care.    Review your medicines if you your doctor has prescribed any.    Until age 30: Get a Pap test every three years (more often if you have had an abnormal result).    After age 30: Talk to your doctor about whether you should have a Pap test every 3 years or have a Pap test with HPV screening every 5 years.   You do not need a Pap test if your uterus was removed (hysterectomy) and you have not had cancer.  You should be tested each year for STDs (sexually transmitted diseases), if you're at risk.   Talk to your provider about how often to have your cholesterol checked.  If you are at risk for diabetes, you should have a diabetes test (fasting glucose).  Shots: Get a flu shot each year. Get a tetanus shot every 10 years.   Nutrition:   Eat at least 5 servings of fruits and vegetables each day.  Eat whole-grain bread, whole-wheat pasta and brown rice instead of white grains and rice.  Get adequate Calcium and Vitamin D.     Lifestyle  Exercise at least 150 minutes a week (30 minutes a day, 5 days of the week). This will help you control your weight and prevent disease.  Limit alcohol to one drink per day.  No smoking.   Wear sunscreen to prevent skin cancer.  See your dentist every six months for an exam and cleaning.

## 2022-11-04 LAB
BKR LAB AP GYN ADEQUACY: NORMAL
BKR LAB AP GYN INTERPRETATION: NORMAL
BKR LAB AP HPV REFLEX: NORMAL
BKR LAB AP PREVIOUS ABNORMAL: NORMAL
PATH REPORT.COMMENTS IMP SPEC: NORMAL
PATH REPORT.COMMENTS IMP SPEC: NORMAL
PATH REPORT.RELEVANT HX SPEC: NORMAL

## 2022-11-08 LAB
HUMAN PAPILLOMA VIRUS 16 DNA: NEGATIVE
HUMAN PAPILLOMA VIRUS 18 DNA: NEGATIVE
HUMAN PAPILLOMA VIRUS FINAL DIAGNOSIS: NORMAL
HUMAN PAPILLOMA VIRUS OTHER HR: NEGATIVE

## 2022-11-16 ENCOUNTER — ALLIED HEALTH/NURSE VISIT (OUTPATIENT)
Dept: FAMILY MEDICINE | Facility: CLINIC | Age: 37
End: 2022-11-16
Payer: COMMERCIAL

## 2022-11-16 DIAGNOSIS — Z23 NEED FOR IMMUNIZATION AGAINST INFLUENZA: Primary | ICD-10-CM

## 2022-11-16 PROCEDURE — 99207 PR NO CHARGE NURSE ONLY: CPT

## 2022-11-16 PROCEDURE — 90686 IIV4 VACC NO PRSV 0.5 ML IM: CPT

## 2022-11-16 PROCEDURE — 90471 IMMUNIZATION ADMIN: CPT

## 2023-10-03 ENCOUNTER — PATIENT OUTREACH (OUTPATIENT)
Dept: CARE COORDINATION | Facility: CLINIC | Age: 38
End: 2023-10-03
Payer: COMMERCIAL

## 2023-10-17 ENCOUNTER — PATIENT OUTREACH (OUTPATIENT)
Dept: CARE COORDINATION | Facility: CLINIC | Age: 38
End: 2023-10-17
Payer: COMMERCIAL

## 2023-10-30 ENCOUNTER — IMMUNIZATION (OUTPATIENT)
Dept: FAMILY MEDICINE | Facility: CLINIC | Age: 38
End: 2023-10-30
Payer: COMMERCIAL

## 2023-10-30 PROCEDURE — 90686 IIV4 VACC NO PRSV 0.5 ML IM: CPT

## 2023-10-30 PROCEDURE — 90471 IMMUNIZATION ADMIN: CPT

## 2023-11-06 ENCOUNTER — IMMUNIZATION (OUTPATIENT)
Dept: FAMILY MEDICINE | Facility: CLINIC | Age: 38
End: 2023-11-06
Payer: COMMERCIAL

## 2023-11-06 PROCEDURE — 90480 ADMN SARSCOV2 VAC 1/ONLY CMP: CPT

## 2023-11-06 PROCEDURE — 91320 SARSCV2 VAC 30MCG TRS-SUC IM: CPT

## 2023-11-11 DIAGNOSIS — F41.9 ANXIETY: ICD-10-CM

## 2023-11-13 RX ORDER — CITALOPRAM HYDROBROMIDE 20 MG/1
20 TABLET ORAL DAILY
Qty: 90 TABLET | Refills: 0 | Status: SHIPPED | OUTPATIENT
Start: 2023-11-13 | End: 2023-11-22

## 2023-11-22 ENCOUNTER — OFFICE VISIT (OUTPATIENT)
Dept: FAMILY MEDICINE | Facility: CLINIC | Age: 38
End: 2023-11-22
Payer: COMMERCIAL

## 2023-11-22 VITALS
OXYGEN SATURATION: 98 % | WEIGHT: 163 LBS | HEIGHT: 68 IN | TEMPERATURE: 96.9 F | HEART RATE: 77 BPM | SYSTOLIC BLOOD PRESSURE: 106 MMHG | DIASTOLIC BLOOD PRESSURE: 68 MMHG | RESPIRATION RATE: 12 BRPM | BODY MASS INDEX: 24.71 KG/M2

## 2023-11-22 DIAGNOSIS — Z12.31 VISIT FOR SCREENING MAMMOGRAM: ICD-10-CM

## 2023-11-22 DIAGNOSIS — Z00.00 ROUTINE GENERAL MEDICAL EXAMINATION AT A HEALTH CARE FACILITY: Primary | ICD-10-CM

## 2023-11-22 DIAGNOSIS — F41.9 ANXIETY: ICD-10-CM

## 2023-11-22 DIAGNOSIS — R41.840 IMPAIRED CONCENTRATION: ICD-10-CM

## 2023-11-22 DIAGNOSIS — Z12.83 SKIN CANCER SCREENING: ICD-10-CM

## 2023-11-22 PROCEDURE — 99213 OFFICE O/P EST LOW 20 MIN: CPT | Mod: 25 | Performed by: INTERNAL MEDICINE

## 2023-11-22 PROCEDURE — 99396 PREV VISIT EST AGE 40-64: CPT | Performed by: INTERNAL MEDICINE

## 2023-11-22 RX ORDER — CITALOPRAM HYDROBROMIDE 20 MG/1
20 TABLET ORAL DAILY
Qty: 90 TABLET | Refills: 0 | Status: SHIPPED | OUTPATIENT
Start: 2023-11-22 | End: 2024-02-28

## 2023-11-22 ASSESSMENT — ENCOUNTER SYMPTOMS
HEMATURIA: 0
FEVER: 0
HEADACHES: 0
JOINT SWELLING: 0
NAUSEA: 0
FREQUENCY: 0
DIARRHEA: 0
NERVOUS/ANXIOUS: 0
CONSTIPATION: 0
SHORTNESS OF BREATH: 0
DYSURIA: 0
BREAST MASS: 0
PARESTHESIAS: 0
HEARTBURN: 0
ARTHRALGIAS: 0
WEAKNESS: 0
SORE THROAT: 0
ABDOMINAL PAIN: 0
MYALGIAS: 0
PALPITATIONS: 0
CHILLS: 0
EYE PAIN: 0
HEMATOCHEZIA: 0
COUGH: 0
DIZZINESS: 0

## 2023-11-22 ASSESSMENT — PAIN SCALES - GENERAL: PAINLEVEL: NO PAIN (0)

## 2023-11-22 NOTE — PROGRESS NOTES
SUBJECTIVE:   Jonna is a 38 year old, presenting for the following:  Physical and Anxiety        11/22/2023     7:54 AM   Additional Questions   Roomed by marisol castillo   Accompanied by self         11/22/2023     7:54 AM   Patient Reported Additional Medications   Patient reports taking the following new medications none       Healthy Habits:     Getting at least 3 servings of Calcium per day:  NO    Bi-annual eye exam:  Yes    Dental care twice a year:  Yes    Sleep apnea or symptoms of sleep apnea:  None    Diet:  Regular (no restrictions)    Frequency of exercise:  None    Taking medications regularly:  No    Barriers to taking medications:  None    Medication side effects:  Not applicable    Additional concerns today:  Yes      Chief Complaint   Patient presents with    Physical    Anxiety     Derm  Wants referral to derm for skin cancer screen    Anxiety Follow-Up  How are you doing with your anxiety since your last visit? No change  Are you having other symptoms that might be associated with anxiety? No  Have you had a significant life event? No   Are you feeling depressed? No  Do you have any concerns with your use of alcohol or other drugs? No  We discussed tapering off medication but she has opted to continue as she feels that is working well without side effects  Is noticing problems w/memory; told daughter a story that she had told her the day prior and had no memory of the discussion   also notes this as well  No previous diagnosis of ADHD;  finds she does have hard time learning new information by reading; does better by demonstration; this is long term;  grades were good, no behavior concerns in school.  Rare social EtOH use, no drugs  Sleep is ok; sometimes problems to fall asleep - brain is racing with tasks to do    Social History     Tobacco Use    Smoking status: Former     Packs/day: 0.00     Years: 8.00     Additional pack years: 0.00     Total pack years: 0.00     Types: Cigarettes      Quit date: 2011     Years since quittin.8    Smokeless tobacco: Never   Substance Use Topics    Alcohol use: Yes     Alcohol/week: 0.0 standard drinks of alcohol     Comment: rarely    Drug use: No         10/17/2017     8:32 AM 2020     7:39 AM 2022     9:15 AM   ABDOUL-7 SCORE   Total Score 3 2 0         10/17/2017     8:32 AM 2020     7:39 AM 2022     9:15 AM   PHQ   PHQ-9 Total Score 2 2 2   Q9: Thoughts of better off dead/self-harm past 2 weeks Not at all Not at all Not at all         2022     9:15 AM   Last PHQ-9   1.  Little interest or pleasure in doing things 0   2.  Feeling down, depressed, or hopeless 0   3.  Trouble falling or staying asleep, or sleeping too much 1   4.  Feeling tired or having little energy 1   5.  Poor appetite or overeating 0   6.  Feeling bad about yourself 0   7.  Trouble concentrating 0   8.  Moving slowly or restless 0   Q9: Thoughts of better off dead/self-harm past 2 weeks 0   PHQ-9 Total Score 2   Difficulty at work, home, or with people Not difficult at all         2022     9:15 AM   ABDOUL-7    1. Feeling nervous, anxious, or on edge 0   2. Not being able to stop or control worrying 0   3. Worrying too much about different things 0   4. Trouble relaxing 0   5. Being so restless that it is hard to sit still 0   6. Becoming easily annoyed or irritable 0   7. Feeling afraid, as if something awful might happen 0   ABDOUL-7 Total Score 0   If you checked any problems, how difficult have they made it for you to do your work, take care of things at home, or get along with other people? Not difficult at all         Social History     Tobacco Use    Smoking status: Former     Packs/day: 0.00     Years: 8.00     Additional pack years: 0.00     Total pack years: 0.00     Types: Cigarettes     Quit date: 2011     Years since quittin.8    Smokeless tobacco: Never   Substance Use Topics    Alcohol use: Yes     Alcohol/week: 0.0 standard drinks of  alcohol     Comment: rarely             2023    10:37 AM   Alcohol Use   Prescreen: >3 drinks/day or >7 drinks/week? No       Reviewed orders with patient.  Reviewed health maintenance and updated orders accordingly - Yes  Current Outpatient Medications   Medication Sig Dispense Refill    CALCIUM PO       citalopram (CELEXA) 20 MG tablet TAKE 1 TABLET BY MOUTH EVERY DAY 90 tablet 0    IBUPROFEN PO Take by mouth as needed for moderate pain      Multiple Vitamins-Minerals (MULTIVITAMIN ADULT PO)          Breast Cancer Screenin/2/2022     8:42 AM   Breast CA Risk Assessment (FHS-7)   Do you have a family history of breast, colon, or ovarian cancer? No / Unknown       click delete button to remove this line now  Mammogram Screening - Offered annual screening and updated Health Maintenance for mutual plan based on risk factor consideration  Pertinent mammograms are reviewed under the imaging tab.    History of abnormal Pap smear: NO - age 30-65 PAP every 5 years with negative HPV co-testing recommended      Latest Ref Rng & Units 2022     9:58 AM 3/6/2017     9:25 AM 3/6/2017     9:21 AM   PAP / HPV   PAP  Negative for Intraepithelial Lesion or Malignancy (NILM)      PAP (Historical)    NIL    HPV 16 DNA Negative Negative  Negative     HPV 18 DNA Negative Negative  Negative     Other HR HPV Negative Negative  Negative       Reviewed and updated as needed this visit by clinical staff     Meds              Reviewed and updated as needed this visit by Provider                     Review of Systems   Constitutional:  Negative for chills and fever.   HENT:  Negative for congestion, ear pain, hearing loss and sore throat.    Eyes:  Negative for pain and visual disturbance.   Respiratory:  Negative for cough and shortness of breath.    Cardiovascular:  Negative for chest pain, palpitations and peripheral edema.   Gastrointestinal:  Negative for abdominal pain, constipation, diarrhea, heartburn,  "hematochezia and nausea.   Breasts:  Negative for tenderness, breast mass and discharge.   Genitourinary:  Negative for dysuria, frequency, genital sores, hematuria, pelvic pain, urgency, vaginal bleeding and vaginal discharge.   Musculoskeletal:  Negative for arthralgias, joint swelling and myalgias.   Skin:  Negative for rash.   Neurological:  Negative for dizziness, weakness, headaches and paresthesias.   Psychiatric/Behavioral:  Negative for mood changes. The patient is not nervous/anxious.           OBJECTIVE:   /68 (BP Location: Right arm, Patient Position: Sitting, Cuff Size: Adult Regular)   Pulse 77   Temp 96.9  F (36.1  C) (Tympanic)   Resp 12   Ht 1.72 m (5' 7.72\")   Wt 73.9 kg (163 lb)   LMP 11/17/2023 (Exact Date)   SpO2 98%   BMI 24.99 kg/m    Physical Exam  GENERAL: healthy, alert and no distress  EYES: Eyes grossly normal to inspection, PERRL and conjunctivae and sclerae normal  HENT: ear canals and TM's normal, nose and mouth without ulcers or lesions  NECK: no adenopathy, no asymmetry, masses, or scars and thyroid normal to palpation  RESP: lungs clear to auscultation - no rales, rhonchi or wheezes  BREAST: normal without masses, tenderness or nipple discharge and no palpable axillary masses or adenopathy  CV: regular rate and rhythm, normal S1 S2, no S3 or S4, no murmur, click or rub, no peripheral edema and peripheral pulses strong  ABDOMEN: soft, nontender, no hepatosplenomegaly, no masses and bowel sounds normal  MS: no gross musculoskeletal defects noted, no edema  SKIN: no suspicious lesions or rashes  NEURO: Normal strength and tone, mentation intact and speech normal  PSYCH: mentation appears normal, affect normal/bright        ASSESSMENT/PLAN:       ICD-10-CM    1. Routine general medical examination at a health care facility  Z00.00       2. Anxiety  F41.9 citalopram (CELEXA) 20 MG tablet     Adult Mental Health  Referral     OFFICE/OUTPT VISIT,ESTLEVL III      3. " Impaired concentration  R41.840 Adult Mental Health  Referral     OFFICE/OUTPT VISIT,EST,LEVL III      4. Visit for screening mammogram  Z12.31 *MA Screening Digital Bilateral      5. Skin cancer screening  Z12.83 Adult Dermatology  Referral        Referral to ADHD testing  You are due for a mammogram.  Please call [phone] to schedule.  Referral to Derm    Patient has been advised of split billing requirements and indicates understanding: Yes      COUNSELING:  Reviewed preventive health counseling, as reflected in patient instructions        She reports that she quit smoking about 12 years ago. Her smoking use included cigarettes. She has never used smokeless tobacco.          Shanta Middleton, [facility]

## 2023-11-22 NOTE — PATIENT INSTRUCTIONS
Referral to ADHD testing  You are due for a mammogram.  Please call [phone] to schedule.  Referral to Derm      Preventive Health Recommendations  Female Ages 40 to 49    Yearly exam:   See your health care provider every year in order to  Review health changes.   Discuss preventive care.    Review your medicines if your doctor prescribed any.    Get a Pap test every three years (unless you have an abnormal result and your provider advises testing more often).    If you get Pap tests with HPV test, you only need to test every 5 years, unless you have an abnormal result. You do not need a Pap test if your uterus was removed (hysterectomy) and you have not had cancer.    You should be tested each year for STDs (sexually transmitted diseases), if you're at risk.   Ask your doctor if you should have a mammogram.    Have a colonoscopy (test for colon cancer) beginning at age 45.  Ask your provider about other options like a yearly FIT test or Cologuard test every 3 years (stool tests)      Have a cholesterol test every 5 years.     Have a diabetes test (fasting glucose) after age 45. If you are at risk for diabetes, you should have this test every 3 years.    Shots: Get a flu shot each year. Get a tetanus shot every 10 years.     Nutrition:   Eat at least 5 servings of fruits and vegetables each day.  Eat whole-grain bread, whole-wheat pasta and brown rice instead of white grains and rice.  Get adequate Calcium and Vitamin D.      Lifestyle  Exercise at least 150 minutes a week (an average of 30 minutes a day, 5 days a week). This will help you control your weight and prevent disease.  Limit alcohol to one drink per day.  No smoking.   Wear sunscreen to prevent skin cancer.  See your dentist every six months for an exam and cleaning.

## 2024-02-03 ENCOUNTER — HEALTH MAINTENANCE LETTER (OUTPATIENT)
Age: 39
End: 2024-02-03

## 2024-02-28 DIAGNOSIS — F41.9 ANXIETY: ICD-10-CM

## 2024-02-28 NOTE — TELEPHONE ENCOUNTER
Pending Prescriptions:                       Disp   Refills    citalopram (CELEXA) 20 MG tablet          90 tab*1            Sig: Take 1 tablet (20 mg) by mouth daily    Routing refill request to provider for review/approval because:  Drug interaction warning      Edwin Maldonado RN

## 2024-02-28 NOTE — TELEPHONE ENCOUNTER
Pending Prescriptions:                       Disp   Refills    citalopram (CELEXA) 20 MG tablet          90 tab*0            Sig: Take 1 tablet (20 mg) by mouth daily

## 2024-02-29 RX ORDER — CITALOPRAM HYDROBROMIDE 20 MG/1
20 TABLET ORAL DAILY
Qty: 90 TABLET | Refills: 2 | Status: SHIPPED | OUTPATIENT
Start: 2024-02-29

## 2024-03-20 ENCOUNTER — E-VISIT (OUTPATIENT)
Dept: URGENT CARE | Facility: CLINIC | Age: 39
End: 2024-03-20
Payer: COMMERCIAL

## 2024-03-20 ENCOUNTER — LAB (OUTPATIENT)
Dept: LAB | Facility: CLINIC | Age: 39
End: 2024-03-20
Payer: COMMERCIAL

## 2024-03-20 DIAGNOSIS — J02.9 SORE THROAT: Primary | ICD-10-CM

## 2024-03-20 DIAGNOSIS — J02.9 SORE THROAT: ICD-10-CM

## 2024-03-20 LAB
DEPRECATED S PYO AG THROAT QL EIA: NEGATIVE
GROUP A STREP BY PCR: NOT DETECTED

## 2024-03-20 PROCEDURE — 99421 OL DIG E/M SVC 5-10 MIN: CPT | Performed by: EMERGENCY MEDICINE

## 2024-03-20 PROCEDURE — 87651 STREP A DNA AMP PROBE: CPT

## 2024-03-20 NOTE — PATIENT INSTRUCTIONS
Dear Jonna,    After reviewing your responses, I would like you to come in for a swab to make sure we treat you correctly. This swab is to evaluate you for possible Strep Throat, and should be scheduled for today or tomorrow. Please use the Schedule Now button in [employer] to schedule your swab. Otherwise, click this link to schedule a lab only appointment.    Lab appointments are not available at most locations on the weekends. If no Lab Only appointment is available, you should be seen in any of our convenient Urgent Care Centers for an in person visit, which can be found on our website here.    You will receive instructions with your results in [employer] once they are available.     If your symptoms worsen, you develop difficulty breathing, difficulty with drinking enough to stay hydrated, difficulty swallowing your saliva or have fevers for more than 5 days, please contact your primary care provider for an appointment or visit an Urgent Care Center to be seen.      Thanks again for choosing us as your health care partner.   Bhargav Roberts MD  Sore Throat: Care Instructions  Overview     Infection by bacteria or a virus causes most sore throats. Cigarette smoke, dry air, air pollution, allergies, and yelling can also cause a sore throat. Sore throats can be painful and annoying. Fortunately, most sore throats go away on their own. If you have a bacterial infection, your doctor may prescribe antibiotics.  Follow-up care is a key part of your treatment and safety. Be sure to make and go to all appointments, and call your doctor if you are having problems. It's also a good idea to know your test results and keep a list of the medicines you take.  How can you care for yourself at home?  If your doctor prescribed antibiotics, take them as directed. Do not stop taking them just because you feel better. You need to take the full course of antibiotics.  Gargle with warm salt water several times a day to help reduce  "swelling and relieve pain. Mix 1/2 teaspoon of salt in 1 cup of warm water.  Take an over-the-counter pain medicine, such as acetaminophen (Tylenol), ibuprofen (Advil, Motrin), or naproxen (Aleve). Read and follow all instructions on the label.  Be careful when taking over-the-counter cold or flu medicines and Tylenol at the same time. Many of these medicines have acetaminophen, which is Tylenol. Read the labels to make sure that you are not taking more than the recommended dose. Too much acetaminophen (Tylenol) can be harmful.  Drink plenty of fluids. Fluids may help soothe an irritated throat. Hot fluids, such as tea or soup, may help decrease throat pain.  Use over-the-counter throat lozenges to soothe pain. Regular cough drops or hard candy may also help. These should not be given to young children because of the risk of choking.  Do not smoke or allow others to smoke around you. If you need help quitting, talk to your doctor about stop-smoking programs and medicines. These can increase your chances of quitting for good.  Use a vaporizer or humidifier to add moisture to your bedroom. Follow the directions for cleaning the machine.  When should you call for help?   Call your doctor now or seek immediate medical care if:    You have trouble breathing.     Your sore throat gets much worse on one side.     You have new or worse trouble swallowing.     You have a new or higher fever.   Watch closely for changes in your health, and be sure to contact your doctor if you do not get better as expected.  Where can you learn more?  Go to https://www.[employer].net/patiented  Enter U420 in the search box to learn more about \"Sore Throat: Care Instructions.\"  Current as of: September 27, 2023               Content Version: 14.0    4479-1359 [employer].   Care instructions adapted under license by your healthcare professional. If you have questions about a medical condition or this instruction, always ask your " healthcare professional. [employer] disclaims any warranty or liability for your use of this information.

## 2024-05-02 ENCOUNTER — OFFICE VISIT (OUTPATIENT)
Dept: DERMATOLOGY | Facility: CLINIC | Age: 39
End: 2024-05-02
Payer: COMMERCIAL

## 2024-05-02 DIAGNOSIS — D18.01 CHERRY ANGIOMA: ICD-10-CM

## 2024-05-02 DIAGNOSIS — L81.4 LENTIGO: ICD-10-CM

## 2024-05-02 DIAGNOSIS — D22.9 MULTIPLE BENIGN NEVI: ICD-10-CM

## 2024-05-02 DIAGNOSIS — Z12.83 SKIN CANCER SCREENING: ICD-10-CM

## 2024-05-02 DIAGNOSIS — L82.1 SEBORRHEIC KERATOSIS: Primary | ICD-10-CM

## 2024-05-02 PROCEDURE — 99203 OFFICE O/P NEW LOW 30 MIN: CPT | Performed by: PHYSICIAN ASSISTANT

## 2024-05-02 NOTE — LETTER
2024         RE: Jonna Pritchard  [address] [facility] [id number]        Dear Colleague,    Thank you for referring your patient, Jonna Pritchard, to the [facility]. Please see a copy of my visit note below.    Jonna Pritchard is an extremely pleasant 38 year old year old female patient here today for skin check. She denies any painful or bleeding skin lesions. No changing nevi. She notes large spot present on back for years. No pain or bleeding. She has had a lot of sun exposure growing up.Patient has no other skin complaints today.  Remainder of the HPI, Meds, PMH, Allergies, FH, and SH was reviewed in chart.    Pertinent Hx:   No personal history of skin cancer.   History reviewed. No pertinent past medical history.    Past Surgical History:   Procedure Laterality Date      SECTION  2015     HC KNEE ARTHROSCOPY, MED+LAT MENISCUS REPAIR Left         Family History   Problem Relation Age of Onset     Hyperlipidemia Mother      Diabetes Father      Hyperlipidemia Father      No Known Problems Brother      Diabetes Maternal Grandfather      Diabetes Paternal Grandmother      Hyperlipidemia Paternal Grandmother        Social History     Socioeconomic History     Marital status:      Spouse name: Not on file     Number of children: Not on file     Years of education: Not on file     Highest education level: Not on file   Occupational History     Not on file   Tobacco Use     Smoking status: Former     Current packs/day: 0.00     Types: Cigarettes     Quit date: 2003     Years since quittin.3     Smokeless tobacco: Never   Vaping Use     Vaping status: Not on file   Substance and Sexual Activity     Alcohol use: Yes     Alcohol/week: 0.0 standard drinks of alcohol     Comment: rarely     Drug use: No     Sexual activity: Yes     Partners: Male     Birth control/protection: Condom   Other Topics Concern     Parent/sibling w/ CABG, MI or  angioplasty before 65F 55M? Not Asked   Social History Narrative     Not on file     Social Determinants of Health     Financial Resource Strain: Low Risk  (11/22/2023)    Financial Resource Strain      Within the past 12 months, have you or your family members you live with been unable to get utilities (heat, electricity) when it was really needed?: No   Food Insecurity: Low Risk  (11/22/2023)    Food Insecurity      Within the past 12 months, did you worry that your food would run out before you got money to buy more?: No      Within the past 12 months, did the food you bought just not last and you didn t have money to get more?: No   Transportation Needs: Low Risk  (11/22/2023)    Transportation Needs      Within the past 12 months, has lack of transportation kept you from medical appointments, getting your medicines, non-medical meetings or appointments, work, or from getting things that you need?: No   Physical Activity: Not on file   Stress: Not on file   Social Connections: Not on file   Interpersonal Safety: Not on file   Housing Stability: Low Risk  (11/22/2023)    Housing Stability      Do you have housing? : Yes      Are you worried about losing your housing?: No       Outpatient Encounter Medications as of 5/2/2024   Medication Sig Dispense Refill     CALCIUM PO        citalopram (CELEXA) 20 MG tablet Take 1 tablet (20 mg) by mouth daily 90 tablet 2     IBUPROFEN PO Take by mouth as needed for moderate pain       Multiple Vitamins-Minerals (MULTIVITAMIN ADULT PO)        No facility-administered encounter medications on file as of 5/2/2024.             O:   NAD, WDWN, Alert & Oriented, Mood & Affect wnl, Vitals stable   Here today alone   There were no vitals taken for this visit.   General appearance normal   Vitals stable   Alert, oriented and in no acute distress     Stuck on papules and brown macules on trunk and ext   Red papules on trunk  Brown papules and macules with regular pigment network and  borders on torso and extremities      The remainder of skin exam is normal       Eyes: Conjunctivae/lids:Normal     ENT: Lips, mucosa: normal    MSK:Normal    Cardiovascular: peripheral edema none    Pulm: Breathing Normal    Neuro/Psych: Orientation:Alert and Orientedx3 ; Mood/Affect:normal   A/P:  1. Seborrheic keratosis, lentigo, angioma, benign nevi   It was a pleasure speaking to Jonna Pritchard today.  BENIGN LESIONS DISCUSSED WITH PATIENT:  I discussed the specifics of tumor, prognosis, and genetics of benign lesions.  I explained that treatment of these lesions would be purely cosmetic and not medically neccessary.  I discussed with patient different removal options including excision, cautery and /or laser.      Nature and genetics of benign skin lesions dicussed with patient.  Signs and Symptoms of skin cancer discussed with patient.  ABCDEs of melanoma reviewed with patient.  Patient encouraged to perform monthly skin exams.  UV precautions reviewed with patient.  Risks of non-melanoma skin cancer discussed with patient   Return to clinic in one year or sooner if needed.    Again, thank you for allowing me to participate in the care of your patient.        Sincerely,        Wendy Recio PA-C

## 2024-05-02 NOTE — PATIENT INSTRUCTIONS
Spot on Back: Seborrheic keratosis, not cancerous.  Spots around Eye: Milia. Use a gentle exfoliating wash for this.   White spots: Sun damage, more noticeable in the gerardo months. Not cancerous.    Patient Education       Proper skin care from [facility]:    -Eliminate harsh soaps as they strip the natural oils from the skin, often resulting in dry itchy skin ( i.e. Dial, Zest, [place])  -Use mild soaps such as Cetaphil or Dove Sensitive Skin in the shower. You do not need to use soap on arms, legs, and trunk every time you shower unless visibly soiled.   -Avoid hot or cold showers.  -After showering, lightly dry off and apply moisturizing within 2-3 minutes. This will help trap moisture in the skin.   -Aggressive use of a moisturizer at least 1-2 times a day to the entire body (including -Vanicream, Cetaphil, Aquaphor or Cerave) and moisturize hands after every washing.  -We recommend using moisturizers that come in a tub that needs to be scooped out, not a pump. This has more of an oil base. It will hold moisture in your skin much better than a water base moisturizer. The above recommended are non-pore clogging.      Wear a sunscreen with at least SPF 30 on your face, ears, neck and V of the chest daily. Wear sunscreen on other areas of the body if those areas are exposed to the sun throughout the day. Sunscreens can contain physical and/or chemical blockers. Physical blockers are less likely to clog pores, these include zinc oxide and titanium dioxide. Reapply every two hour and after swimming.     Sunscreen examples: https://www.ewg.org/sunscreen/    UV radiation  UVA radiation remains constant throughout the day and throughout the year. It is a longer wavelength than UVB and therefore penetrates deeper into the skin leading to immediate and delayed tanning, photoaging, and skin cancer. 70-80% of UVA and UVB radiation occurs between the hours of 10am-2pm.  UVB radiation  UVB radiation causes the  most harmful effects and is more significant during the summer months. However, snow and ice can reflect UVB radiation leading to skin damage during the winter months as well. UVB radiation is responsible for tanning, burning, inflammation, delayed erythema (pinkness), pigmentation (brown spots), and skin cancer.     I recommend self monthly full body exams and yearly full body exams with a dermatology provider. If you develop a new or changing lesion please follow up for examination. Most skin cancers are pink and scaly or pink and pearly. However, we do see blue/brown/black skin cancers.  Consider the ABCDEs of melanoma when giving yourself your monthly full body exam ( don't forget the groin, buttocks, feet, toes, etc). A-asymmetry, B-borders, C-color, D-diameter, E-elevation or evolving. If you see any of these changes please follow up in clinic. If you cannot see your back I recommend purchasing a hand held mirror to use with a larger wall mirror.       Checking for Skin Cancer  You can find cancer early by checking your skin each month. There are 3 kinds of skin cancer. They are melanoma, basal cell carcinoma, and squamous cell carcinoma. Doing monthly skin checks is the best way to find new marks or skin changes. Follow the instructions below for checking your skin.   The ABCDEs of checking moles for melanoma   Check your moles or growths for signs of melanoma using ABCDE:   Asymmetry: the sides of the mole or growth don t match  Border: the edges are ragged, notched, or blurred  Color: the color within the mole or growth varies  Diameter: the mole or growth is larger than 6 mm (size of a pencil eraser)  Evolving: the size, shape, or color of the mole or growth is changing (evolving is not shown in the images below)    Checking for other types of skin cancer  Basal cell carcinoma or squamous cell carcinoma have symptoms such as:     A spot or mole that looks different from all other marks on your  skin  Changes in how an area feels, such as itching, tenderness, or pain  Changes in the skin's surface, such as oozing, bleeding, or scaliness  A sore that does not heal  New swelling or redness beyond the border of a mole    Who s at risk?  Anyone can get skin cancer. But you are at greater risk if you have:   Fair skin, light-colored hair, or light-colored eyes  Many moles or abnormal moles on your skin  A history of sunburns from sunlight or tanning beds  A family history of skin cancer  A history of exposure to radiation or chemicals  A weakened immune system  If you have had skin cancer in the past, you are at risk for recurring skin cancer.   How to check your skin  Do your monthly skin checkups in front of a full-length mirror. Check all parts of your body, including your:   Head (ears, face, neck, and scalp)  Torso (front, back, and sides)  Arms (tops, undersides, upper, and lower armpits)  Hands (palms, backs, and fingers, including under the nails)  Buttocks and genitals  Legs (front, back, and sides)  Feet (tops, soles, toes, including under the nails, and between toes)  If you have a lot of moles, take digital photos of them each month. Make sure to take photos both up close and from a distance. These can help you see if any moles change over time.   Most skin changes are not cancer. But if you see any changes in your skin, call your doctor right away. Only he or she can diagnose a problem. If you have skin cancer, seeing your doctor can be the first step toward getting the treatment that could save your life.   [employer] last reviewed this educational content on 4/1/2019 2000-2020 [employer]. [address] [place] [address], [place] [address]. All rights reserved. This information is not intended as a substitute for professional medical care. Always follow your healthcare professional's instructions.       When should I call my doctor?  If you are worsening or not improving, please, contact us  or seek urgent care as noted below.     Who should I call with questions (adults)?  [facility] (adult and pediatric): [phone]  [facility] (adult): [phone]  [facility] ([place], [place], [place] and [place]) [phone]  For urgent needs outside of business hours call the [facility] at [phone] and ask for the dermatology resident on call to be paged  If this is a medical emergency and you are unable to reach an ER, Call 911      If you need a prescription refill, please contact your pharmacy. Refills are approved or denied by our Physicians during normal business hours, Monday through Fridays  Per office policy, refills will not be granted if you have not been seen within the past year (or sooner depending on your child's condition)

## 2024-05-03 NOTE — PROGRESS NOTES
Jonna Pritchard is an extremely pleasant 38 year old year old female patient here today for skin check. She denies any painful or bleeding skin lesions. No changing nevi. She notes large spot present on back for years. No pain or bleeding. She has had a lot of sun exposure growing up.Patient has no other skin complaints today.  Remainder of the HPI, Meds, PMH, Allergies, FH, and SH was reviewed in chart.    Pertinent Hx:   No personal history of skin cancer.   History reviewed. No pertinent past medical history.    Past Surgical History:   Procedure Laterality Date     SECTION  2015    HC KNEE ARTHROSCOPY, MED+LAT MENISCUS REPAIR Left         Family History   Problem Relation Age of Onset    Hyperlipidemia Mother     Diabetes Father     Hyperlipidemia Father     No Known Problems Brother     Diabetes Maternal Grandfather     Diabetes Paternal Grandmother     Hyperlipidemia Paternal Grandmother        Social History     Socioeconomic History    Marital status:      Spouse name: Not on file    Number of children: Not on file    Years of education: Not on file    Highest education level: Not on file   Occupational History    Not on file   Tobacco Use    Smoking status: Former     Current packs/day: 0.00     Types: Cigarettes     Quit date: 2003     Years since quittin.3    Smokeless tobacco: Never   Vaping Use    Vaping status: Not on file   Substance and Sexual Activity    Alcohol use: Yes     Alcohol/week: 0.0 standard drinks of alcohol     Comment: rarely    Drug use: No    Sexual activity: Yes     Partners: Male     Birth control/protection: Condom   Other Topics Concern    Parent/sibling w/ CABG, MI or angioplasty before 65F 55M? Not Asked   Social History Narrative    Not on file     Social Determinants of Health     Financial Resource Strain: Low Risk  (2023)    Financial Resource Strain     Within the past 12 months, have you or your family members you live with been  unable to get utilities (heat, electricity) when it was really needed?: No   Food Insecurity: Low Risk  (11/22/2023)    Food Insecurity     Within the past 12 months, did you worry that your food would run out before you got money to buy more?: No     Within the past 12 months, did the food you bought just not last and you didn t have money to get more?: No   Transportation Needs: Low Risk  (11/22/2023)    Transportation Needs     Within the past 12 months, has lack of transportation kept you from medical appointments, getting your medicines, non-medical meetings or appointments, work, or from getting things that you need?: No   Physical Activity: Not on file   Stress: Not on file   Social Connections: Not on file   Interpersonal Safety: Not on file   Housing Stability: Low Risk  (11/22/2023)    Housing Stability     Do you have housing? : Yes     Are you worried about losing your housing?: No       Outpatient Encounter Medications as of 5/2/2024   Medication Sig Dispense Refill    CALCIUM PO       citalopram (CELEXA) 20 MG tablet Take 1 tablet (20 mg) by mouth daily 90 tablet 2    IBUPROFEN PO Take by mouth as needed for moderate pain      Multiple Vitamins-Minerals (MULTIVITAMIN ADULT PO)        No facility-administered encounter medications on file as of 5/2/2024.             O:   NAD, WDWN, Alert & Oriented, Mood & Affect wnl, Vitals stable   Here today alone   There were no vitals taken for this visit.   General appearance normal   Vitals stable   Alert, oriented and in no acute distress     Stuck on papules and brown macules on trunk and ext   Red papules on trunk  Brown papules and macules with regular pigment network and borders on torso and extremities      The remainder of skin exam is normal       Eyes: Conjunctivae/lids:Normal     ENT: Lips, mucosa: normal    MSK:Normal    Cardiovascular: peripheral edema none    Pulm: Breathing Normal    Neuro/Psych: Orientation:Alert and Orientedx3 ; Mood/Affect:normal    A/P:  1. Seborrheic keratosis, lentigo, angioma, benign nevi   It was a pleasure speaking to Jonna Pritchard today.  BENIGN LESIONS DISCUSSED WITH PATIENT:  I discussed the specifics of tumor, prognosis, and genetics of benign lesions.  I explained that treatment of these lesions would be purely cosmetic and not medically neccessary.  I discussed with patient different removal options including excision, cautery and /or laser.      Nature and genetics of benign skin lesions dicussed with patient.  Signs and Symptoms of skin cancer discussed with patient.  ABCDEs of melanoma reviewed with patient.  Patient encouraged to perform monthly skin exams.  UV precautions reviewed with patient.  Risks of non-melanoma skin cancer discussed with patient   Return to clinic in one year or sooner if needed.

## 2024-05-14 NOTE — PROGRESS NOTES
SUBJECTIVE:  HPI:  Jonna Pritchard  Is a 38 year old female who presents for new patient evaluation of low back pain self-referred    Jonna started having pain with no inciting event about a week and a half ago.  It sharp pain in the back of the pelvis radiating to both buttocks and intermittently she gets an ache in her left lateral thigh and if she is driving it goes down to the lateral ankle..  She specifically denies numbness tingling or weakness, bowel or bladder dysfunction, or saddle anesthesia.  1 year ago she had similar symptoms, was on vacation, never saw anybody, and the symptoms disappeared in 2 weeks.    SYMPTOMS WORSENED WITH sitting    SYMPTOMS IMPROVED WITH walking, standing    Pain score and diagram reviewed.  See questionnaire.      ROS: .  Otherwise negative for bowel/bladder dysfunction, balance changes, headache, leg pain/numbness/weakness, fevers, chills, night sweats, unexplained weight loss;  otherwise unremarkable.   See the patient's intake questionnaire from today for details.    MEDICATIONS:  Reviewed.    ALLERGIES:  Reviewed.     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  She and her  have an 8-year-old child.  Sports hobbies and activities: Walking, biking, activities with the family.      OBJECTIVE:    IMAGING: No spinal imaging    EXAMINATION:    --CONSTITUTIONAL:   She appears uncomfortable however she transitions and moves fluidly.  BMI appropriate.  The patient is well nourished and well groomed.  --SKIN:  Skin over the face, bilateral lower extremities, and posterior torso is clean, dry, intact without rashes.    --GAIT:  is non-antalgic. Flat foot, heel and toe walking:  normal   .  Squat and rise   normal    .  --STANDING EXAMINATION:    Symmetry of spine/pelvis   unremarkable   .      Range of motion limited in  flexion hands to knees with burning in the lumbosacral junction.  Almost completely limited in extension with sharp pain across the lumbosacral junction.   Standing flexion     positive right.    Anjel's sign   negative    .     Stork test     positive right.   --NEUROLOGICAL:     ROMBERG, TANDEM WALK, PRONATOR DRIFT:   Normal.   .  SENSATION to light touch is intact in bilateral thighs, lower legs and feet.   REFLEXES:  patellar 2+ brisk, and achilles 2+ brisk.  Babinski is negative. No clonus.  MANUAL MOTOR TESTING:  L1- S1 Myotomes, Femoral, Obturator, Peroneal and Tibial nerves 5/5   DURAL STRETCH TESTS:  SLR negative but bilaterally it causes pain in the low back.    --PELVIC/HIP JOINTS:                Long Sitting      right long to short.      Spring testing negative bilateral SI and lumbar.  Decreased left SI compliance.      PELVIC ALIGNMENT right inferior innominate shear.  Left posterior sacral torsion   --LUMBAR/GLUTEAL MUSCLES: Negative tenderness or spasm.    Procedure note-OMT:  Manual medicine restore normal pelvic alignment.  Afterwards she was able to bend over and touch her toes with minimal pain and she had restoration of normal nearly painless extension.  Standing flexion test and stork test became negative.  She still had some decreased compliance over the left SI joint but better than before      ASSESSMENT: Jonna Pritchard is a 38 year old female who presents  today for new patient evaluation of:    Acute low back pain  Pelvic Joint Dysfunction manifesting as a right inferior innominate shear and a left posterior sacral torsion.  Encouraging response to OMT  Neurologically intact  Gluteal myofascial pain      PLAN:  Self-correction.  Easy walking on level ground.  Pelvic stabilization therapy in [place] and return to clinic in 4 to 6 weeks if she still having significant problems.    Advised patient to call or return early if symptoms worsen, or having problems controlling bladder and bowel  function or worsening leg weakness.     Please note: Voice recognition software was used in this dictation.  It may therefore contain typographical errors.    Sotero Flores MD

## 2024-05-15 ENCOUNTER — OFFICE VISIT (OUTPATIENT)
Dept: NEUROSURGERY | Facility: CLINIC | Age: 39
End: 2024-05-15
Payer: COMMERCIAL

## 2024-05-15 VITALS
DIASTOLIC BLOOD PRESSURE: 80 MMHG | SYSTOLIC BLOOD PRESSURE: 117 MMHG | HEIGHT: 68 IN | HEART RATE: 73 BPM | WEIGHT: 165 LBS | BODY MASS INDEX: 25.01 KG/M2

## 2024-05-15 DIAGNOSIS — M79.18 MYOFASCIAL PAIN: ICD-10-CM

## 2024-05-15 DIAGNOSIS — M54.50 ACUTE BILATERAL LOW BACK PAIN WITHOUT SCIATICA: ICD-10-CM

## 2024-05-15 DIAGNOSIS — M99.05 SOMATIC DYSFUNCTION OF PELVIS REGION: Primary | ICD-10-CM

## 2024-05-15 PROCEDURE — 98925 OSTEOPATH MANJ 1-2 REGIONS: CPT | Performed by: PREVENTIVE MEDICINE

## 2024-05-15 PROCEDURE — 99203 OFFICE O/P NEW LOW 30 MIN: CPT | Mod: 25 | Performed by: PREVENTIVE MEDICINE

## 2024-05-15 ASSESSMENT — PAIN SCALES - GENERAL: PAINLEVEL: MODERATE PAIN (5)

## 2024-05-15 NOTE — NURSING NOTE
"Reason For Visit:   Chief Complaint   Patient presents with    Consult     Low back pain         Occupation: computer work  Currently working? Yes.  Work status?  Full time.    Sports: n  Activities: Walking biking             /80   Pulse 73   Ht 1.727 m (5' 8\")   Wt 74.8 kg (165 lb)   BMI 25.09 kg/m        Allergies   Allergen Reactions    Penicillins        Current Outpatient Medications   Medication Sig Dispense Refill    CALCIUM PO       citalopram (CELEXA) 20 MG tablet Take 1 tablet (20 mg) by mouth daily 90 tablet 2    IBUPROFEN PO Take by mouth as needed for moderate pain      Multiple Vitamins-Minerals (MULTIVITAMIN ADULT PO)        No current facility-administered medications for this visit.         Darla Severin-Brown, LPN   "

## 2024-05-15 NOTE — PATIENT INSTRUCTIONS
"Yesika am glad you came in so we could identify and begin treatment for your Pelvic Joint Dysfunction.  I am optimistic that you will do really well.  I do not think we will end up having to meet again but I am here if you need me in 4 to 6 weeks.  Do the self-correction each morning that I taught you as described below and your therapist will give you more exercises.  The fact that you came in early represents an excellent prognosis.    ASSESSMENT: Jonna Pritchard is a 38 year old female who presents  today for new patient evaluation of:    Acute low back pain  Pelvic Joint Dysfunction manifesting as a right inferior innominate shear and a left posterior sacral torsion.  Neurologically intact  Gluteal myofascial pain      PLAN:  Self-correction.  Easy walking on level ground.  Pelvic stabilization therapy in [place] and return to clinic in 4 to 6 weeks if she still having significant problems.        PELVIC JOINT SELF CORRECTION EXERCISES      It is best to do this first thing in the morning, and can be repeated once or twice during the day.    \"SHOTGUN\" TECHNIQUE:  This loosens up the front and back of the pelvis.  Do this before the Broomstick exercise.  Use on object such as a rectangular laundry basket.  Lie on your back with your knees bent, feet together and flat on the floor.    Spread your knees approximately 12-24 inches around the outside of an upright laundry basket.  Squeeze your knees together, breathing as you do this.    Concentrate on keeping your buttocks relaxed and on the ground.    A brief discomfort in the front of the pelvis and even a popping sound is normal.  Hold the squeeze for 3-5 seconds.    Relax for 3-5 seconds.    Repeat 2 more times.    Now reverse your knee position to the inside of the upside down laundry basket while still lying with your knees bent up, feet on the ground.  Pull your knees apart, breathing easy as you do this.  Hold the squeeze for 3-5 seconds.    Relax for 3-5 " seconds.    Repeat 2 more times.    BROOMSTICK EXERCISE:  Lie on your back with your knees bent.  Slide a broomstick or similar object above one knee, and below the opposite knee.  Firmly hold the stick with your hands will bringing your knees closed to your belly, preferably high enough that your back can rest flat on the ground.  Still holding the stick, scissors your legs against the stick, breathing as you do this.    (Push down to your foot with leg on top of the broomstick, and lift up to your chin with the leg below the broomstick).  Hold the squeeze for 3-5 seconds.    Relax for 3-5 seconds.    Repeat 2 more times.  Switch the position of the broomstick above the other knee, and below the first knee.  Repeat the exercise as above in this new position.

## 2024-05-15 NOTE — LETTER
5/15/2024         RE: Jonna Pritchard  [address] [id number]        Dear Colleague,    Thank you for referring your patient, Jonna Pritchard, to the [facility] [place]. Please see a copy of my visit note below.        SUBJECTIVE:  HPI:  Jonna Pritchard  Is a 38 year old female who presents for new patient evaluation of low back pain self-referred    Jonna started having pain with no inciting event about a week and a half ago.  It sharp pain in the back of the pelvis radiating to both buttocks and intermittently she gets an ache in her left lateral thigh and if she is driving it goes down to the lateral ankle..  She specifically denies numbness tingling or weakness, bowel or bladder dysfunction, or saddle anesthesia.  1 year ago she had similar symptoms, was on vacation, never saw anybody, and the symptoms disappeared in 2 weeks.    SYMPTOMS WORSENED WITH sitting    SYMPTOMS IMPROVED WITH walking, standing    Pain score and diagram reviewed.  See questionnaire.      ROS: .  Otherwise negative for bowel/bladder dysfunction, balance changes, headache, leg pain/numbness/weakness, fevers, chills, night sweats, unexplained weight loss;  otherwise unremarkable.   See the patient's intake questionnaire from today for details.    MEDICATIONS:  Reviewed.    ALLERGIES:  Reviewed.     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  She and her  have an 8-year-old child.  Sports hobbies and activities: Walking, biking, activities with the family.      OBJECTIVE:    IMAGING: No spinal imaging    EXAMINATION:    --CONSTITUTIONAL:   She appears uncomfortable however she transitions and moves fluidly.  BMI appropriate.  The patient is well nourished and well groomed.  --SKIN:  Skin over the face, bilateral lower  extremities, and posterior torso is clean, dry, intact without rashes.    --GAIT:  is non-antalgic. Flat foot, heel and toe walking:  normal   .  Squat and rise   normal    .  --STANDING EXAMINATION:    Symmetry of spine/pelvis   unremarkable   .      Range of motion limited in flexion hands to knees with burning in the lumbosacral junction.  Almost completely limited in extension with sharp pain across the lumbosacral junction.   Standing flexion     positive right.    Anjel's sign   negative    .     Stork test     positive right.   --NEUROLOGICAL:     ROMBERG, TANDEM WALK, PRONATOR DRIFT:   Normal.   .  SENSATION to light touch is intact in bilateral thighs, lower legs and feet.   REFLEXES:  patellar 2+ brisk, and achilles 2+ brisk.  Babinski is negative. No clonus.  MANUAL MOTOR TESTING:  L1- S1 Myotomes, Femoral, Obturator, Peroneal and Tibial nerves 5/5   DURAL STRETCH TESTS:  SLR negative but bilaterally it causes pain in the low back.    --PELVIC/HIP JOINTS:                Long Sitting      right long to short.      Spring testing negative bilateral SI and lumbar.  Decreased left SI compliance.      PELVIC ALIGNMENT right inferior innominate shear.  Left posterior sacral torsion   --LUMBAR/GLUTEAL MUSCLES: Negative tenderness or spasm.    Procedure note-OMT:  Manual medicine restore normal pelvic alignment.  Afterwards she was able to bend over and touch her toes with minimal pain and she had restoration of normal nearly painless extension.  Standing flexion test and stork test became negative.  She still had some decreased compliance over the left SI joint but better than before      ASSESSMENT: Jonna Pritchard is a 38 year old female who presents  today for new patient evaluation of:    Acute low back pain  Pelvic Joint Dysfunction manifesting as a right inferior innominate shear and a left posterior sacral torsion.  Encouraging response to OMT  Neurologically intact  Gluteal myofascial  pain      PLAN:  Self-correction.  Easy walking on level ground.  Pelvic stabilization therapy in [place] and return to clinic in 4 to 6 weeks if she still having significant problems.    Advised patient to call or return early if symptoms worsen, or having problems controlling bladder and bowel function or worsening leg weakness.     Please note: Voice recognition software was used in this dictation.  It may therefore contain typographical errors.    Sotero Flores MD             Again, thank you for allowing me to participate in the care of your patient.        Sincerely,        Sotero Flores MD

## 2024-05-21 ENCOUNTER — THERAPY VISIT (OUTPATIENT)
Dept: PHYSICAL THERAPY | Facility: CLINIC | Age: 39
End: 2024-05-21
Attending: PREVENTIVE MEDICINE
Payer: COMMERCIAL

## 2024-05-21 DIAGNOSIS — M99.05 SOMATIC DYSFUNCTION OF PELVIS REGION: ICD-10-CM

## 2024-05-21 DIAGNOSIS — M79.18 MYOFASCIAL PAIN: ICD-10-CM

## 2024-05-21 DIAGNOSIS — M54.50 ACUTE BILATERAL LOW BACK PAIN WITHOUT SCIATICA: ICD-10-CM

## 2024-05-21 DIAGNOSIS — M99.05 SOMATIC DYSFUNCTION OF PELVIC REGION: Primary | ICD-10-CM

## 2024-05-21 PROCEDURE — 97110 THERAPEUTIC EXERCISES: CPT | Mod: GP | Performed by: PHYSICAL THERAPIST

## 2024-05-21 PROCEDURE — 97140 MANUAL THERAPY 1/> REGIONS: CPT | Mod: GP | Performed by: PHYSICAL THERAPIST

## 2024-05-21 PROCEDURE — 97161 PT EVAL LOW COMPLEX 20 MIN: CPT | Mod: GP | Performed by: PHYSICAL THERAPIST

## 2024-05-21 NOTE — PROGRESS NOTES
PHYSICAL THERAPY EVALUATION  Type of Visit: Evaluation    See electronic medical record for Abuse and Falls Screening details.    Subjective LBP year ago, lasted 2-3 weeks, sx increased 2 weeks ago, no reason.  Sx increase sitting, car rides, airplanes, standing/walking helps relieve. Some sx down L leg , side of leg. Dr. Flores did some things which helped couple days.       Presenting condition or subjective complaint: Low back pain caused by pelvis  Date of onset: 05/07/24    Relevant medical history:     Dates & types of surgery:      Prior diagnostic imaging/testing results:       Prior therapy history for the same diagnosis, illness or injury: No      Prior Level of Function  Transfers: Independent  Ambulation: Independent  ADL: Independent  IADL:     Living Environment  Social support: With family members   Type of home: House   Stairs to enter the home: Yes   Is there a railing: Yes   Ramp: No   Stairs inside the home: Yes 12 Is there a railing: Yes   Help at home: None  Equipment owned:       Employment: Yes  computers, has sit /stand  desk, travels a lot for work  Hobbies/Interests:      Patient goals for therapy:  decrease pain    Pain assessment:      Objective   LUMBAR SPINE EVALUATION  PAIN: 2-3/10, as bad as 8/10  INTEGUMENTARY (edema, incisions):   POSTURE: normal  GAIT:   Weightbearing Status:   Assistive Device(s):   Gait Deviations:   BALANCE/PROPRIOCEPTION:   WEIGHTBEARING ALIGNMENT:   NON-WEIGHTBEARING ALIGNMENT:    ROM: LROM flex WNL, tight lumbar, EXT 80% central LBP, SB R 50% stiff, L 75% pain L SI  PELVIC/SI SCREEN: Standing Forward Bend: +R, Mitesh (March): neg, Pubic Symphysis Provocation: neg  Supine L leg long, R ASIS inferior, R on R sacral torsion  STRENGTH: hip abd 5/5, glut med 5/5, fatigues quickly with reps    MYOTOMES:   DTR S:   CORD SIGNS:   DERMATOMES:   NEURAL TENSION:   FLEXIBILITY: hams 75*, hip flexor tight R>L, quad WNL, piriformis tight L  LUMBAR/HIP Special Tests:  PA's  neg, repeated prone extension painfree  PELVIS/SI SPECIAL TESTS:   FUNCTIONAL TESTS:   PALPATION: tender L SI, sacral edge  SPINAL SEGMENTAL CONCLUSIONS:       Assessment & Plan   CLINICAL IMPRESSIONS  Medical Diagnosis: Somatic dysfunction of pelvis region    Treatment Diagnosis: LBP, SI dysfunction   Impression/Assessment: Patient is a 38 year old female with LBP complaints.  The following significant findings have been identified: Pain, Decreased ROM/flexibility, Decreased joint mobility, Decreased strength, and muscular endurance . These impairments interfere with their ability to perform work tasks, recreational activities, household chores, driving , and community mobility as compared to previous level of function.     Clinical Decision Making (Complexity):  Clinical Presentation: Stable/Uncomplicated  Clinical Presentation Rationale: based on medical and personal factors listed in PT evaluation  Clinical Decision Making (Complexity): Low complexity    PLAN OF CARE  Treatment Interventions:  Interventions: Manual Therapy, Neuromuscular Re-education, Therapeutic Activity, Therapeutic Exercise, Self-Care/Home Management    Long Term Goals     PT Goal 1  Goal Identifier: 1  Goal Description: pt will be able to do household tasks w/o LBP in 6wk  Target Date: 07/02/24  PT Goal 2  Goal Identifier: 2  Goal Description: will be mili to sit 60 min  w/o LBP i n4wk  Target Date: 06/18/24      Frequency of Treatment: 1x/wk  Duration of Treatment: 6wks    Recommended Referrals to Other Professionals:   Education Assessment:   Learner/Method: Patient;Pictures/Video;No Barriers to Learning    Risks and benefits of evaluation/treatment have been explained.   Patient/Family/caregiver agrees with Plan of Care.     Evaluation Time:     PT Eval, Low Complexity Minutes (69564): 15       Signing Clinician: Kris Hoenk, PT

## 2024-05-31 ENCOUNTER — THERAPY VISIT (OUTPATIENT)
Dept: PHYSICAL THERAPY | Facility: CLINIC | Age: 39
End: 2024-05-31
Attending: PREVENTIVE MEDICINE
Payer: COMMERCIAL

## 2024-05-31 DIAGNOSIS — M99.05 SOMATIC DYSFUNCTION OF PELVIC REGION: Primary | ICD-10-CM

## 2024-05-31 PROCEDURE — 97110 THERAPEUTIC EXERCISES: CPT | Mod: GP | Performed by: PHYSICAL THERAPIST

## 2024-05-31 PROCEDURE — 97140 MANUAL THERAPY 1/> REGIONS: CPT | Mod: GP | Performed by: PHYSICAL THERAPIST

## 2024-06-01 ENCOUNTER — HOSPITAL ENCOUNTER (OUTPATIENT)
Dept: MAMMOGRAPHY | Facility: CLINIC | Age: 39
Discharge: HOME | End: 2024-06-01
Attending: INTERNAL MEDICINE | Admitting: INTERNAL MEDICINE
Payer: COMMERCIAL

## 2024-06-01 DIAGNOSIS — Z12.31 VISIT FOR SCREENING MAMMOGRAM: ICD-10-CM

## 2024-06-01 PROCEDURE — 77063 BREAST TOMOSYNTHESIS BI: CPT

## 2024-06-05 ENCOUNTER — THERAPY VISIT (OUTPATIENT)
Dept: PHYSICAL THERAPY | Facility: CLINIC | Age: 39
End: 2024-06-05
Attending: PREVENTIVE MEDICINE
Payer: COMMERCIAL

## 2024-06-05 DIAGNOSIS — M99.05 SOMATIC DYSFUNCTION OF PELVIC REGION: Primary | ICD-10-CM

## 2024-06-05 PROCEDURE — 97140 MANUAL THERAPY 1/> REGIONS: CPT | Mod: GP | Performed by: PHYSICAL THERAPIST

## 2024-07-17 NOTE — PROGRESS NOTES
PHYSICAL THERAPY DISCHARGE  Pt cancelled last visits stating she was better, appt not needed.   06/05/24 0500   Appointment Info   Signing clinician's name / credentials Kris Hoenk, PT   Visits Used 3   Medical Diagnosis Somatic dysfunction of pelvis region   PT Tx Diagnosis LBP, SI dysfunction   Progress Note/Certification   Onset of illness/injury or Date of Surgery 05/07/24   Therapy Frequency 1x/wk   Predicted Duration 6wks   GOALS   PT Goals 2   PT Goal 1   Goal Identifier 1   Goal Description pt will be able to do household tasks w/o LBP in 6wk   Goal Progress progressing   Target Date 07/02/24   PT Goal 2   Goal Identifier 2   Goal Description will be mili to sit 60 min  w/o LBP i n4wk   Target Date 06/18/24   Subjective Report   Subjective Report has felt good since Friday, knew this appt was close together but no other time worked.   Objective Measures   Objective Measures Objective Measure 1   Objective Measure 1   Details fwd bend +R, Gillet neg, R ASIS very slight inf   Treatment Interventions (PT)   Interventions Therapeutic Procedure/Exercise;Manual Therapy   Therapeutic Procedure/Exercise   Therapeutic Procedures: strength, endurance, ROM, flexibility minutes (26704) 2   Ther Proc 1 - Details hip flexor stretch in kneeling   Skilled Intervention stretch for hip to HEP   Manual Therapy   Manual Therapy: Mobilization, MFR, MLD, friction massage minutes (65179) 17   Manual Therapy 1 - Details MET pube clearing, R ant ilium correction ,   prone PA R sacral base, STM R>L LB, QL, MFR central LB and R QL   Skilled Intervention MET jt mob, mobility   Patient Response/Progress flex WNL, EXT WNL still central Pain   Education   Learner/Method Patient;Pictures/Video;No Barriers to Learning   Plan   Home program ex listed   Plan for next session se in 10 days, will cx if still doing well   Total Session Time   Timed Code Treatment Minutes 19   Total Treatment Time (sum of timed and untimed services) 19     M  [facility]  Kris Hoenk PT  [facility]  [address] [place] [address]  [email or web address]   Office: [phone]  Voicemail: [phone]

## 2024-10-23 ENCOUNTER — PATIENT OUTREACH (OUTPATIENT)
Dept: CARE COORDINATION | Facility: CLINIC | Age: 39
End: 2024-10-23
Payer: COMMERCIAL

## 2024-11-06 ENCOUNTER — PATIENT OUTREACH (OUTPATIENT)
Dept: CARE COORDINATION | Facility: CLINIC | Age: 39
End: 2024-11-06
Payer: COMMERCIAL

## 2024-11-11 ENCOUNTER — IMMUNIZATION (OUTPATIENT)
Dept: FAMILY MEDICINE | Facility: CLINIC | Age: 39
End: 2024-11-11
Payer: COMMERCIAL

## 2024-11-11 PROCEDURE — 90471 IMMUNIZATION ADMIN: CPT

## 2024-11-11 PROCEDURE — 90656 IIV3 VACC NO PRSV 0.5 ML IM: CPT

## 2024-12-21 ENCOUNTER — HOSPITAL ENCOUNTER (EMERGENCY)
Facility: CLINIC | Age: 39
Discharge: HOME | End: 2024-12-21
Attending: PHYSICIAN ASSISTANT | Admitting: PHYSICIAN ASSISTANT
Payer: COMMERCIAL

## 2024-12-21 VITALS
SYSTOLIC BLOOD PRESSURE: 130 MMHG | OXYGEN SATURATION: 99 % | TEMPERATURE: 98 F | DIASTOLIC BLOOD PRESSURE: 70 MMHG | HEART RATE: 82 BPM | RESPIRATION RATE: 14 BRPM

## 2024-12-21 DIAGNOSIS — J02.9 PHARYNGITIS: ICD-10-CM

## 2024-12-21 LAB — S PYO DNA THROAT QL NAA+PROBE: NOT DETECTED

## 2024-12-21 PROCEDURE — 99213 OFFICE O/P EST LOW 20 MIN: CPT | Performed by: PHYSICIAN ASSISTANT

## 2024-12-21 PROCEDURE — G0463 HOSPITAL OUTPT CLINIC VISIT: HCPCS | Performed by: PHYSICIAN ASSISTANT

## 2024-12-21 PROCEDURE — 87651 STREP A DNA AMP PROBE: CPT

## 2024-12-21 ASSESSMENT — COLUMBIA-SUICIDE SEVERITY RATING SCALE - C-SSRS
2. HAVE YOU ACTUALLY HAD ANY THOUGHTS OF KILLING YOURSELF IN THE PAST MONTH?: NO
6. HAVE YOU EVER DONE ANYTHING, STARTED TO DO ANYTHING, OR PREPARED TO DO ANYTHING TO END YOUR LIFE?: NO
1. IN THE PAST MONTH, HAVE YOU WISHED YOU WERE DEAD OR WISHED YOU COULD GO TO SLEEP AND NOT WAKE UP?: NO

## 2024-12-21 ASSESSMENT — ACTIVITIES OF DAILY LIVING (ADL): ADLS_ACUITY_SCORE: 41

## 2024-12-21 NOTE — ED PROVIDER NOTES
History     Chief Complaint   Patient presents with    Pharyngitis     Scratchy throat x's 2 days      HPI  Jonna Pritchard is a 39 year old female who is urgent care with concern over mild scratchy throat some present for the last 2 days.  She denies any other associated symptoms.  No fever, chills, allergies, nasal congestion, cough, dyspnea, wheezing, vomiting, diarrhea or abdominal complaints.  No OTC treatments.  She states concern that she does have a household contact she has more intense throat pain, fevers, headache concerning for possible strep and wants to ensure she is negative.      Allergies:  Allergies   Allergen Reactions    Penicillins        Problem List:    Patient Active Problem List    Diagnosis Date Noted    Somatic dysfunction of pelvic region 2024     Priority: Medium    Tear of medial meniscus of left knee, current, unspecified tear type, subsequent encounter 2021     Priority: Medium    Chronic pain of left knee 2021     Priority: Medium    Other migraine without status migrainosus, not intractable 2017     Priority: Medium    Anxiety 2017     Priority: Medium    History of  section 2015     Priority: Medium        Past Medical History:    No past medical history on file.    Past Surgical History:    Past Surgical History:   Procedure Laterality Date     SECTION  2015    HC KNEE ARTHROSCOPY, MED+LAT MENISCUS REPAIR Left        Family History:    Family History   Problem Relation Age of Onset    Hyperlipidemia Mother     Diabetes Father     Hyperlipidemia Father     No Known Problems Brother     Diabetes Maternal Grandfather     Diabetes Paternal Grandmother     Hyperlipidemia Paternal Grandmother        Social History:  Marital Status:   [2]  Social History     Tobacco Use    Smoking status: Former     Current packs/day: 0.00     Types: Cigarettes     Quit date: 2003     Years since quittin.9    Smokeless  tobacco: Never   Substance Use Topics    Alcohol use: Yes     Alcohol/week: 0.0 standard drinks of alcohol     Comment: rarely    Drug use: No        Medications:    CALCIUM PO  citalopram (CELEXA) 20 MG tablet  IBUPROFEN PO  Multiple Vitamins-Minerals (MULTIVITAMIN ADULT PO)      Review of Systems  CONSTITUTIONAL:NEGATIVE for fever, chills, change in weight  INTEGUMENTARY/SKIN: NEGATIVE for worrisome rashes, moles or lesions  EYES: NEGATIVE for vision changes or irritation  ENT/MOUTH: POSITIVE for sore throat and NEGATIVE for ear pain, nasal congestion   RESP:NEGATIVE for significant cough or SOB  GI: NEGATIVE for nausea, abdominal pain, heartburn, or change in bowel habits    Review of systems negative except as stated above.  Physical Exam   BP: 130/70  Pulse: 82  Temp: 98  F (36.7  C)  Resp: 14  SpO2: 99 %  Physical Exam  GENERAL APPEARANCE: healthy, alert and no distress  EYES: EOMI,  PERRL, conjunctiva clear  HENT: ear canals and TM's normal.  Nose and mouth without ulcers, erythema or lesions  NECK: supple, nontender, no lymphadenopathy  RESP: lungs clear to auscultation - no rales, rhonchi or wheezes  CV: regular rates and rhythm, normal S1 S2, no murmur noted  SKIN: no suspicious lesions or rashes  ED Course        Procedures       Critical Care time:  none       Results for orders placed or performed during the hospital encounter of 12/21/24 (from the past 24 hours)   Group A Streptococcus PCR Throat Swab    Specimen: Throat; Swab   Result Value Ref Range    Group A strep by PCR Not Detected Not Detected    Narrative    The Xpert Xpress Strep A test, performed on the [employer], is a rapid, qualitative in vitro diagnostic test for the detection of Streptococcus pyogenes (Group A ß-hemolytic Streptococcus, Strep A) in throat swab specimens from patients with signs and symptoms of pharyngitis. The Xpert Xpress Strep A test can be used as an aid in the diagnosis of Group A Streptococcal  pharyngitis. The assay is not intended to monitor treatment for Group A Streptococcus infections. The Xpert Xpress Strep A test utilizes an automated real-time polymerase chain reaction (PCR) to detect Streptococcus pyogenes DNA.     Medications - No data to display    Assessments & Plan (with Medical Decision Making)     I have reviewed the nursing notes.    I have reviewed the findings, diagnosis, plan and need for follow up with the patient.     Discharge Medication List as of 12/21/2024  1:49 PM        Final diagnoses:   Pharyngitis     Strep PCR testing negative.  No evidence of peritonsillar cellulitis or abscess.   I do not suspect mono.  She was instructed to continue OTC symptomatic treatment.  Follow up with PCP if no improvement in 5-7 days.  Worrisome reasons to seek care sooner discussed.      Disclaimer: This note consists of symbols derived from keyboarding, dictation, and/or voice recognition software. As a result, there may be errors in the script that have gone undetected.  Please consider this when interpreting information found in the chart.    12/21/2024   [facility] EMERGENCY DEPT       Jackeline Marcano PA-C  12/23/24 0809

## 2024-12-31 ENCOUNTER — VIRTUAL VISIT (OUTPATIENT)
Dept: URGENT CARE | Facility: CLINIC | Age: 39
End: 2024-12-31
Payer: COMMERCIAL

## 2024-12-31 DIAGNOSIS — N30.00 ACUTE CYSTITIS WITHOUT HEMATURIA: Primary | ICD-10-CM

## 2024-12-31 PROCEDURE — 99441 PR PHYSICIAN TELEPHONE EVALUATION 5-10 MIN: CPT | Mod: 93

## 2024-12-31 RX ORDER — NITROFURANTOIN 25; 75 MG/1; MG/1
100 CAPSULE ORAL 2 TIMES DAILY
Qty: 10 CAPSULE | Refills: 0 | Status: SHIPPED | OUTPATIENT
Start: 2024-12-31 | End: 2025-01-05

## 2024-12-31 NOTE — PROGRESS NOTES
Jonna is a 39 year old who is being evaluated via a billable telephone visit.    What phone number would you like to be contacted at? [phone]  How would you like to obtain your AVS? MyChart      Assessment & Plan     Acute cystitis without hematuria    - nitroFURantoin macrocrystal-monohydrate (MACROBID) 100 MG capsule; Take 1 capsule (100 mg) by mouth 2 times daily for 5 days.    TATIANA Tang CNP      Subjective   Jonna is a 39 year old, presenting for the following health issues:  UTI    HPI     Uti sx dysuria frequency urgency X 1 week  Hydrated       Objective         Vitals:  No vitals were obtained today due to virtual visit.    Physical Exam   GENERAL: alert and no distress  PSYCH: Appropriate affect, tone, and pace of words    Telephone time Visit 10 minutes  Originating Location (pt. Location): Home    Distant Location (provider location):  Off-site  Platform used for Video Visit: Telephone  Signed Electronically by: [facility]

## 2025-01-13 ENCOUNTER — IMMUNIZATION (OUTPATIENT)
Dept: FAMILY MEDICINE | Facility: CLINIC | Age: 40
End: 2025-01-13
Payer: COMMERCIAL

## 2025-01-13 DIAGNOSIS — Z23 ENCOUNTER FOR IMMUNIZATION: Primary | ICD-10-CM

## 2025-01-13 PROCEDURE — 91320 SARSCV2 VAC 30MCG TRS-SUC IM: CPT

## 2025-01-13 PROCEDURE — 99207 PR NO CHARGE NURSE ONLY: CPT

## 2025-01-13 PROCEDURE — 90480 ADMN SARSCOV2 VAC 1/ONLY CMP: CPT

## 2025-01-13 SDOH — HEALTH STABILITY: PHYSICAL HEALTH: ON AVERAGE, HOW MANY MINUTES DO YOU ENGAGE IN EXERCISE AT THIS LEVEL?: 30 MIN

## 2025-01-13 SDOH — HEALTH STABILITY: PHYSICAL HEALTH: ON AVERAGE, HOW MANY DAYS PER WEEK DO YOU ENGAGE IN MODERATE TO STRENUOUS EXERCISE (LIKE A BRISK WALK)?: 2 DAYS

## 2025-01-13 ASSESSMENT — SOCIAL DETERMINANTS OF HEALTH (SDOH): HOW OFTEN DO YOU GET TOGETHER WITH FRIENDS OR RELATIVES?: ONCE A WEEK

## 2025-01-13 NOTE — PROGRESS NOTES
Prior to immunization administration, verified patients identity using patient s name and date of birth. Please see Immunization Activity for additional information.     Is the patient's temperature normal (100.5 or less)? Yes     Patient MEETS CRITERIA. PROCEED with vaccine administration.      Patient instructed to remain in clinic for 15 minutes afterwards, and to report any adverse reactions.      Link to Ancillary Visit Immunization Standing Orders SmartSet     Screening performed by Nasima Forbes CMA on 1/13/2025 at 4:18 PM.

## 2025-01-16 ENCOUNTER — OFFICE VISIT (OUTPATIENT)
Dept: FAMILY MEDICINE | Facility: CLINIC | Age: 40
End: 2025-01-16
Payer: COMMERCIAL

## 2025-01-16 VITALS
WEIGHT: 167.3 LBS | OXYGEN SATURATION: 98 % | BODY MASS INDEX: 25.36 KG/M2 | HEART RATE: 66 BPM | HEIGHT: 68 IN | DIASTOLIC BLOOD PRESSURE: 78 MMHG | TEMPERATURE: 96.6 F | RESPIRATION RATE: 16 BRPM | SYSTOLIC BLOOD PRESSURE: 102 MMHG

## 2025-01-16 DIAGNOSIS — F41.9 ANXIETY: ICD-10-CM

## 2025-01-16 DIAGNOSIS — Z00.00 ROUTINE GENERAL MEDICAL EXAMINATION AT A HEALTH CARE FACILITY: Primary | ICD-10-CM

## 2025-01-16 DIAGNOSIS — M54.59 MECHANICAL LOW BACK PAIN: ICD-10-CM

## 2025-01-16 RX ORDER — CITALOPRAM HYDROBROMIDE 20 MG/1
20 TABLET ORAL DAILY
Qty: 90 TABLET | Refills: 3 | Status: SHIPPED | OUTPATIENT
Start: 2025-01-16

## 2025-01-16 RX ORDER — CYCLOBENZAPRINE HCL 5 MG
5-10 TABLET ORAL 3 TIMES DAILY PRN
Qty: 20 TABLET | Refills: 3 | Status: SHIPPED | OUTPATIENT
Start: 2025-01-16

## 2025-01-16 NOTE — PROGRESS NOTES
"Preventive Care Visit  [facility]  Shanta Middleton DO, Internal Medicine  Jan 16, 2025      Assessment & Plan   Problem List Items Addressed This Visit       Anxiety    Relevant Medications    citalopram (CELEXA) 20 MG tablet     Other Visit Diagnoses       Routine general medical examination at a health care facility    -  Primary    Mechanical low back pain        Relevant Medications    cyclobenzaprine (FLEXERIL) 5 MG tablet           Discussed home exercises to help improve back pain.  Discussed short course of muscle relaxer during significant flare up of back pain.  Consider follow-up with non-surgical spine.    Patient has been advised of split billing requirements and indicates understanding: Yes       BMI  Estimated body mass index is 25.44 kg/m  as calculated from the following:    Height as of this encounter: 1.727 m (5' 8\").    Weight as of this encounter: 75.9 kg (167 lb 4.8 oz).       Counseling  Appropriate preventive services were addressed with this patient via screening, questionnaire, or discussion as appropriate for fall prevention, nutrition, physical activity, Tobacco-use cessation, social engagement, weight loss and cognition.  Checklist reviewing preventive services available has been given to the patient.  Reviewed patient's diet, addressing concerns and/or questions.   She is at risk for lack of exercise and has been provided with information to increase physical activity for the benefit of her well-being.           Maxi Coyle is a 39 year old, presenting for the following:  Physical        1/16/2025    11:08 AM   Additional Questions   Roomed by chasity   Accompanied by self         1/16/2025    11:08 AM   Patient Reported Additional Medications   Patient reports taking the following new medications none          HPI    Low back pain  --saw [facility] for low back pain and diagnosis was SI joint dysfunction, posterior sacral torsion.  OMT was done " which helped.  Also gluteal myofascial pain  --physical therapy was not very helpful  --onset of back pain 1.5 years ago  --has flare ups in back pain every few months.  Episodes are shorter than previously but still very bothersome  --wonders what to do during episodes and how to prevent future episodes    Anxiety   How are you doing with your anxiety since your last visit? No change  Are you having other symptoms that might be associated with anxiety? No  Have you had a significant life event? No   Are you feeling depressed? No  Do you have any concerns with your use of alcohol or other drugs? No  We discussed tapering off medication but she has opted to continue as she feels that is working well without side effects  Is noticing problems w/memory; told daughter a story that she had told her the day prior and had no memory of the discussion   also notes this as well  No previous diagnosis of ADHD;  finds she does have hard time learning new information by reading; does better by demonstration; this is long term;  grades were good, no behavior concerns in school.  Rare social EtOH use, no drugs  Sleep is ok; sometimes problems to fall asleep - brain is racing with tasks to do  Last visit a year ago we discussed referral for ADHD testing but this was lost to follow-up   Feels she is managing fine and declines ADHD today.  Feels anxiety is reasonably well controlled    Social History     Tobacco Use    Smoking status: Former     Current packs/day: 0.00     Average packs/day: 0.5 packs/day for 8.0 years (4.0 ttl pk-yrs)     Types: Cigarettes     Quit date: 2003     Years since quittin.0    Smokeless tobacco: Never   Substance Use Topics    Alcohol use: Yes     Comment: rarely    Drug use: No         10/17/2017     8:32 AM 2020     7:39 AM 2022     9:15 AM   ABDOUL-7 SCORE   Total Score 3 2 0         10/17/2017     8:32 AM 2020     7:39 AM 2022     9:15 AM   PHQ   PHQ-9 Total Score 2 2  2   Q9: Thoughts of better off dead/self-harm past 2 weeks Not at all Not at all Not at all         11/2/2022     9:15 AM   Last PHQ-9   1.  Little interest or pleasure in doing things 0   2.  Feeling down, depressed, or hopeless 0   3.  Trouble falling or staying asleep, or sleeping too much 1   4.  Feeling tired or having little energy 1   5.  Poor appetite or overeating 0   6.  Feeling bad about yourself 0   7.  Trouble concentrating 0   8.  Moving slowly or restless 0   Q9: Thoughts of better off dead/self-harm past 2 weeks 0   PHQ-9 Total Score 2   Difficulty at work, home, or with people Not difficult at all         11/2/2022     9:15 AM   ABDOUL-7    1. Feeling nervous, anxious, or on edge 0   2. Not being able to stop or control worrying 0   3. Worrying too much about different things 0   4. Trouble relaxing 0   5. Being so restless that it is hard to sit still 0   6. Becoming easily annoyed or irritable 0   7. Feeling afraid, as if something awful might happen 0   ABDOUL-7 Total Score 0   If you checked any problems, how difficult have they made it for you to do your work, take care of things at home, or get along with other people? Not difficult at all     Health Care Directive  Patient does not have a Health Care Directive:       1/13/2025   General Health   How would you rate your overall physical health? Good   Feel stress (tense, anxious, or unable to sleep) Only a little   (!) STRESS CONCERN      1/13/2025   Nutrition   Three or more servings of calcium each day? (!) NO   Diet: Regular (no restrictions)   How many servings of fruit and vegetables per day? (!) 0-1   How many sweetened beverages each day? 0-1         1/13/2025   Exercise   Days per week of moderate/strenous exercise 2 days   Average minutes spent exercising at this level 30 min   (!) EXERCISE CONCERN      1/13/2025   Social Factors   Frequency of gathering with friends or relatives Once a week   Worry food won't last until get money to buy  more No   Food not last or not have enough money for food? No   Do you have housing? (Housing is defined as stable permanent housing and does not include staying ouside in a car, in a tent, in an abandoned building, in an overnight shelter, or couch-surfing.) Yes   Are you worried about losing your housing? No   Lack of transportation? No   Unable to get utilities (heat,electricity)? No         2025   Dental   Dentist two times every year? Yes         2025   TB Screening   Were you born outside of the [place]? No         Today's PHQ-2 Score:       2025     7:37 AM   PHQ-2 (  [employer])   Q1: Little interest or pleasure in doing things 0   Q2: Feeling down, depressed or hopeless 0   PHQ-2 Score 0    Q1: Little interest or pleasure in doing things Not at all   Q2: Feeling down, depressed or hopeless Not at all   PHQ-2 Score 0       Patient-reported           2025   Substance Use   Alcohol more than 3/day or more than 7/wk No   Do you use any other substances recreationally? No     Social History     Tobacco Use    Smoking status: Former     Current packs/day: 0.00     Average packs/day: 0.5 packs/day for 8.0 years (4.0 ttl pk-yrs)     Types: Cigarettes     Quit date: 2003     Years since quittin.0    Smokeless tobacco: Never   Substance Use Topics    Alcohol use: Yes     Comment: rarely    Drug use: No           2024   LAST FHS-7 RESULTS   1st degree relative breast or ovarian cancer No   Any relative bilateral breast cancer No   Any male have breast cancer No   Any ONE woman have BOTH breast AND ovarian cancer No   Any woman with breast cancer before 50yrs No   2 or more relatives with breast AND/OR ovarian cancer No   2 or more relatives with breast AND/OR bowel cancer No        Mammogram Screening - Mammogram every 1-2 years updated in Health Maintenance based on mutual decision making        2025   STI Screening   New sexual partner(s) since last STI/HIV test? No     History of  "abnormal Pap smear: No - age 30- 64 PAP with HPV every 5 years recommended        Latest Ref Rng & Units 11/2/2022     9:58 AM 3/6/2017     9:25 AM 3/6/2017     9:21 AM   PAP / HPV   PAP  Negative for Intraepithelial Lesion or Malignancy (NILM)      PAP (Historical)    NIL    HPV 16 DNA Negative Negative  Negative     HPV 18 DNA Negative Negative  Negative     Other HR HPV Negative Negative  Negative       ASCVD Risk   The 10-year ASCVD risk score (Anne Marie MCKEON, et al., 2019) is: 0.4%    Values used to calculate the score:      Age: 39 years      Sex: Female      Is Non- : No      Diabetic: No      Tobacco smoker: No      Systolic Blood Pressure: 102 mmHg      Is BP treated: No      HDL Cholesterol: 56 mg/dL      Total Cholesterol: 199 mg/dL        1/13/2025   Contraception/Family Planning   Questions about contraception or family planning No        Reviewed and updated as needed this visit by Provider     Meds  Problems                     Review of Systems  Constitutional, neuro, ENT, endocrine, pulmonary, cardiac, gastrointestinal, genitourinary, musculoskeletal, integument and psychiatric systems are negative, except as otherwise noted.     Objective    Exam  /78   Pulse 66   Temp (!) 96.6  F (35.9  C) (Tympanic)   Resp 16   Ht 1.727 m (5' 8\")   Wt 75.9 kg (167 lb 4.8 oz)   SpO2 98%   BMI 25.44 kg/m     Estimated body mass index is 25.44 kg/m  as calculated from the following:    Height as of this encounter: 1.727 m (5' 8\").    Weight as of this encounter: 75.9 kg (167 lb 4.8 oz).    Physical Exam  GENERAL: alert and no distress  EYES: Eyes grossly normal to inspection, PERRL and conjunctivae and sclerae normal  HENT: ear canals and TM's normal, nose and mouth without ulcers or lesions  NECK: no adenopathy, no asymmetry, masses, or scars  RESP: lungs clear to auscultation - no rales, rhonchi or wheezes  CV: regular rate and rhythm, normal S1 S2, no S3 or S4, no " murmur, click or rub, no peripheral edema  ABDOMEN: soft, nontender, no hepatosplenomegaly, no masses and bowel sounds normal  MS: no gross musculoskeletal defects noted, no edema  SKIN: no suspicious lesions or rashes  NEURO: Normal strength and tone, mentation intact and speech normal  PSYCH: mentation appears normal, affect normal/bright        Signed Electronically by: Shanta Middleton DO

## 2025-01-16 NOTE — PATIENT INSTRUCTIONS
Back pain  Consider yoga or pilates classes that work on core strengthening  Discussed standing core exercises - Onrton Carry, overhead carry, unilateral work on 1 side  We discussed having muscle relaxer on hand during episode  Consider follow-up with Dr. Flores    Patient Education   Preventive Care Advice   This is general advice given by our system to help you stay healthy. However, your care team may have specific advice just for you. Please talk to your care team about your preventive care needs.  Nutrition  Eat 5 or more servings of fruits and vegetables each day.  Try wheat bread, brown rice and whole grain pasta (instead of white bread, rice, and pasta).  Get enough calcium and vitamin D. Check the label on foods and aim for 100% of the RDA (recommended daily allowance).  Lifestyle  Exercise at least 150 minutes each week  (30 minutes a day, 5 days a week).  Do muscle strengthening activities 2 days a week. These help control your weight and prevent disease.  No smoking.  Wear sunscreen to prevent skin cancer.  Have a dental exam and cleaning every 6 months.  Yearly exams  See your health care team every year to talk about:  Any changes in your health.  Any medicines your care team has prescribed.  Preventive care, family planning, and ways to prevent chronic diseases.  Shots (vaccines)   HPV shots (up to age 26), if you've never had them before.  Hepatitis B shots (up to age 59), if you've never had them before.  COVID-19 shot: Get this shot when it's due.  Flu shot: Get a flu shot every year.  Tetanus shot: Get a tetanus shot every 10 years.  Pneumococcal, hepatitis A, and RSV shots: Ask your care team if you need these based on your risk.  Shingles shot (for age 50 and up)  General health tests  Diabetes screening:  Starting at age 35, Get screened for diabetes at least every 3 years.  If you are younger than age 35, ask your care team if you should be screened for diabetes.  Cholesterol test: At age 39,  start having a cholesterol test every 5 years, or more often if advised.  Bone density scan (DEXA): At age 50, ask your care team if you should have this scan for osteoporosis (brittle bones).  Hepatitis C: Get tested at least once in your life.  STIs (sexually transmitted infections)  Before age 24: Ask your care team if you should be screened for STIs.  After age 24: Get screened for STIs if you're at risk. You are at risk for STIs (including HIV) if:  You are sexually active with more than one person.  You don't use condoms every time.  You or a partner was diagnosed with a sexually transmitted infection.  If you are at risk for HIV, ask about PrEP medicine to prevent HIV.  Get tested for HIV at least once in your life, whether you are at risk for HIV or not.  Cancer screening tests  Cervical cancer screening: If you have a cervix, begin getting regular cervical cancer screening tests starting at age 21.  Breast cancer scan (mammogram): If you've ever had breasts, begin having regular mammograms starting at age 40. This is a scan to check for breast cancer.  Colon cancer screening: It is important to start screening for colon cancer at age 45.  Have a colonoscopy test every 10 years (or more often if you're at risk) Or, ask your provider about stool tests like a FIT test every year or Cologuard test every 3 years.  To learn more about your testing options, visit:   .  For help making a decision, visit:   https://bit.ly/sc21993.  Prostate cancer screening test: If you have a prostate, ask your care team if a prostate cancer screening test (PSA) at age 55 is right for you.  Lung cancer screening: If you are a current or former smoker ages 50 to 80, ask your care team if ongoing lung cancer screenings are right for you.  For informational purposes only. Not to replace the advice of your health care provider. Copyright   2023 [employer]. All rights reserved. Clinically reviewed by the [facility]  Transitions Program. [employer] 645364 - REV 01/24.

## 2025-05-06 ENCOUNTER — OFFICE VISIT (OUTPATIENT)
Dept: DERMATOLOGY | Facility: CLINIC | Age: 40
End: 2025-05-06
Attending: PHYSICIAN ASSISTANT
Payer: COMMERCIAL

## 2025-05-06 DIAGNOSIS — D18.01 CHERRY ANGIOMA: Primary | ICD-10-CM

## 2025-05-06 DIAGNOSIS — L82.1 SEBORRHEIC KERATOSIS: ICD-10-CM

## 2025-05-06 DIAGNOSIS — L81.4 LENTIGO: ICD-10-CM

## 2025-05-06 DIAGNOSIS — D22.9 MULTIPLE BENIGN NEVI: ICD-10-CM

## 2025-05-06 PROCEDURE — 99213 OFFICE O/P EST LOW 20 MIN: CPT | Performed by: PHYSICIAN ASSISTANT

## 2025-05-06 NOTE — LETTER
2025      Jonna Pritchard  [address] [id number]      Dear Colleague,    Thank you for referring your patient, Jonna Pritchard, to the [facility]. Please see a copy of my visit note below.    Jonna Pritchard is a pleasant 39 year old year old female patient here today for skin check. She denies any painful or bleeding skin lesions. No changing nevi. She notes white spot on cheek, present for one month.  No pain or bleeding. She has had a lot of sun exposure growing up.Patient has no other skin complaints today.  Remainder of the HPI, Meds, PMH, Allergies, FH, and SH was reviewed in chart.    Pertinent Hx:   No personal history of skin cancer.   No past medical history on file.    Past Surgical History:   Procedure Laterality Date      SECTION  2015     HC KNEE ARTHROSCOPY, MED+LAT MENISCUS REPAIR Left         Family History   Problem Relation Age of Onset     Hyperlipidemia Mother      Diabetes Father      Hyperlipidemia Father      No Known Problems Brother      Diabetes Maternal Grandfather      Diabetes Paternal Grandmother      Hyperlipidemia Paternal Grandmother        Social History     Socioeconomic History     Marital status:      Spouse name: Not on file     Number of children: Not on file     Years of education: Not on file     Highest education level: Not on file   Occupational History     Not on file   Tobacco Use     Smoking status: Former     Current packs/day: 0.00     Average packs/day: 0.5 packs/day for 8.0 years (4.0 ttl pk-yrs)     Types: Cigarettes     Quit date: 2003     Years since quittin.3     Smokeless tobacco: Never   Vaping Use     Vaping status: Not on file   Substance and Sexual Activity     Alcohol use: Yes     Comment: rarely     Drug use: No     Sexual activity: Yes     Partners: Male     Birth control/protection: Condom   Other Topics Concern     Parent/sibling w/ CABG, MI or angioplasty before 65F 55M?  No   Social History Narrative     Not on file     Social Drivers of Health     Financial Resource Strain: Low Risk  (1/13/2025)    Financial Resource Strain      Within the past 12 months, have you or your family members you live with been unable to get utilities (heat, electricity) when it was really needed?: No   Food Insecurity: Low Risk  (1/13/2025)    Food Insecurity      Within the past 12 months, did you worry that your food would run out before you got money to buy more?: No      Within the past 12 months, did the food you bought just not last and you didn t have money to get more?: No   Transportation Needs: Low Risk  (1/13/2025)    Transportation Needs      Within the past 12 months, has lack of transportation kept you from medical appointments, getting your medicines, non-medical meetings or appointments, work, or from getting things that you need?: No   Physical Activity: Insufficiently Active (1/13/2025)    Exercise Vital Sign      Days of Exercise per Week: 2 days      Minutes of Exercise per Session: 30 min   Stress: No Stress Concern Present (1/13/2025)    [employer] - Occupational Stress Questionnaire      Feeling of Stress : Only a little   Social Connections: Unknown (1/13/2025)    Social Connection and Isolation Panel [NHANES]      Frequency of Communication with Friends and Family: Not on file      Frequency of Social Gatherings with Friends and Family: Once a week      Attends [employer] Services: Not on file      Active Member of Clubs or Organizations: Not on file      Attends Club or Organization Meetings: Not on file      Marital Status: Not on file   Interpersonal Safety: Low Risk  (1/16/2025)    Interpersonal Safety      Do you feel physically and emotionally safe where you currently live?: Yes      Within the past 12 months, have you been hit, slapped, kicked or otherwise physically hurt by someone?: No      Within the past 12 months, have you been humiliated  or emotionally abused in other ways by your partner or ex-partner?: No   Housing Stability: Low Risk  (1/13/2025)    Housing Stability      Do you have housing? : Yes      Are you worried about losing your housing?: No       Outpatient Encounter Medications as of 5/6/2025   Medication Sig Dispense Refill     CALCIUM PO        citalopram (CELEXA) 20 MG tablet Take 1 tablet (20 mg) by mouth daily. 90 tablet 3     cyclobenzaprine (FLEXERIL) 5 MG tablet Take 1-2 tablets (5-10 mg) by mouth 3 times daily as needed for muscle spasms. 20 tablet 3     IBUPROFEN PO Take by mouth as needed for moderate pain       Multiple Vitamins-Minerals (MULTIVITAMIN ADULT PO)        No facility-administered encounter medications on file as of 5/6/2025.             O:   NAD, WDWN, Alert & Oriented, Mood & Affect wnl, Vitals stable   Here today alone   There were no vitals taken for this visit.   General appearance normal   Vitals stable   Alert, oriented and in no acute distress     Stuck on papules and brown macules on trunk and ext   Red papules on trunk  Brown papules and macules with regular pigment network and borders on torso and extremities    White papule on left cheek   The remainder of skin exam is normal       Eyes: Conjunctivae/lids:Normal     ENT: Lips: normal    MSK:Normal    Cardiovascular: peripheral edema none    Pulm: Breathing Normal    Neuro/Psych: Orientation:Alert and Orientedx3 ; Mood/Affect:normal   A/P:  1. Seborrheic keratosis, lentigo, angioma, benign nevi   Milia, with patient consent, unroofed 18 gauge needle and expressed. No charge.   It was a pleasure speaking to Jonna Pritchard today.  BENIGN LESIONS DISCUSSED WITH PATIENT:  I discussed the specifics of tumor, prognosis, and genetics of benign lesions.  I explained that treatment of these lesions would be purely cosmetic and not medically neccessary.  I discussed with patient different removal options including excision, cautery and /or laser.      Nature and  genetics of benign skin lesions dicussed with patient.  Signs and Symptoms of skin cancer discussed with patient.  ABCDEs of melanoma reviewed with patient.  Patient encouraged to perform monthly skin exams.  UV precautions reviewed with patient.  Risks of non-melanoma skin cancer discussed with patient   Return to clinic in one year or sooner if needed.      Again, thank you for allowing me to participate in the care of your patient.        Sincerely,        Wendy Recio PA-C    Electronically signed

## 2025-05-06 NOTE — PROGRESS NOTES
Jonna Pritchard is a pleasant 39 year old year old female patient here today for skin check. She denies any painful or bleeding skin lesions. No changing nevi. She notes white spot on cheek, present for one month.  No pain or bleeding. She has had a lot of sun exposure growing up.Patient has no other skin complaints today.  Remainder of the HPI, Meds, PMH, Allergies, FH, and SH was reviewed in chart.    Pertinent Hx:   No personal history of skin cancer.   No past medical history on file.    Past Surgical History:   Procedure Laterality Date     SECTION  2015    HC KNEE ARTHROSCOPY, MED+LAT MENISCUS REPAIR Left         Family History   Problem Relation Age of Onset    Hyperlipidemia Mother     Diabetes Father     Hyperlipidemia Father     No Known Problems Brother     Diabetes Maternal Grandfather     Diabetes Paternal Grandmother     Hyperlipidemia Paternal Grandmother        Social History     Socioeconomic History    Marital status:      Spouse name: Not on file    Number of children: Not on file    Years of education: Not on file    Highest education level: Not on file   Occupational History    Not on file   Tobacco Use    Smoking status: Former     Current packs/day: 0.00     Average packs/day: 0.5 packs/day for 8.0 years (4.0 ttl pk-yrs)     Types: Cigarettes     Quit date: 2003     Years since quittin.3    Smokeless tobacco: Never   Vaping Use    Vaping status: Not on file   Substance and Sexual Activity    Alcohol use: Yes     Comment: rarely    Drug use: No    Sexual activity: Yes     Partners: Male     Birth control/protection: Condom   Other Topics Concern    Parent/sibling w/ CABG, MI or angioplasty before 65F 55M? No   Social History Narrative    Not on file     Social Drivers of Health     Financial Resource Strain: Low Risk  (2025)    Financial Resource Strain     Within the past 12 months, have you or your family members you live with been unable to get utilities  (heat, electricity) when it was really needed?: No   Food Insecurity: Low Risk  (1/13/2025)    Food Insecurity     Within the past 12 months, did you worry that your food would run out before you got money to buy more?: No     Within the past 12 months, did the food you bought just not last and you didn t have money to get more?: No   Transportation Needs: Low Risk  (1/13/2025)    Transportation Needs     Within the past 12 months, has lack of transportation kept you from medical appointments, getting your medicines, non-medical meetings or appointments, work, or from getting things that you need?: No   Physical Activity: Insufficiently Active (1/13/2025)    Exercise Vital Sign     Days of Exercise per Week: 2 days     Minutes of Exercise per Session: 30 min   Stress: No Stress Concern Present (1/13/2025)    [employer] [place] [employer] - Occupational Stress Questionnaire     Feeling of Stress : Only a little   Social Connections: Unknown (1/13/2025)    Social Connection and Isolation Panel [NHANES]     Frequency of Communication with Friends and Family: Not on file     Frequency of Social Gatherings with Friends and Family: Once a week     Attends [employer] Services: Not on file     Active Member of Clubs or Organizations: Not on file     Attends Club or Organization Meetings: Not on file     Marital Status: Not on file   Interpersonal Safety: Low Risk  (1/16/2025)    Interpersonal Safety     Do you feel physically and emotionally safe where you currently live?: Yes     Within the past 12 months, have you been hit, slapped, kicked or otherwise physically hurt by someone?: No     Within the past 12 months, have you been humiliated or emotionally abused in other ways by your partner or ex-partner?: No   Housing Stability: Low Risk  (1/13/2025)    Housing Stability     Do you have housing? : Yes     Are you worried about losing your housing?: No       Outpatient Encounter Medications as of 5/6/2025    Medication Sig Dispense Refill    CALCIUM PO       citalopram (CELEXA) 20 MG tablet Take 1 tablet (20 mg) by mouth daily. 90 tablet 3    cyclobenzaprine (FLEXERIL) 5 MG tablet Take 1-2 tablets (5-10 mg) by mouth 3 times daily as needed for muscle spasms. 20 tablet 3    IBUPROFEN PO Take by mouth as needed for moderate pain      Multiple Vitamins-Minerals (MULTIVITAMIN ADULT PO)        No facility-administered encounter medications on file as of 5/6/2025.             O:   NAD, WDWN, Alert & Oriented, Mood & Affect wnl, Vitals stable   Here today alone   There were no vitals taken for this visit.   General appearance normal   Vitals stable   Alert, oriented and in no acute distress     Stuck on papules and brown macules on trunk and ext   Red papules on trunk  Brown papules and macules with regular pigment network and borders on torso and extremities    White papule on left cheek   The remainder of skin exam is normal       Eyes: Conjunctivae/lids:Normal     ENT: Lips: normal    MSK:Normal    Cardiovascular: peripheral edema none    Pulm: Breathing Normal    Neuro/Psych: Orientation:Alert and Orientedx3 ; Mood/Affect:normal   A/P:  1. Seborrheic keratosis, lentigo, angioma, benign nevi   Milia, with patient consent, unroofed 18 gauge needle and expressed. No charge.   It was a pleasure speaking to Jonna Pritchard today.  BENIGN LESIONS DISCUSSED WITH PATIENT:  I discussed the specifics of tumor, prognosis, and genetics of benign lesions.  I explained that treatment of these lesions would be purely cosmetic and not medically neccessary.  I discussed with patient different removal options including excision, cautery and /or laser.      Nature and genetics of benign skin lesions dicussed with patient.  Signs and Symptoms of skin cancer discussed with patient.  ABCDEs of melanoma reviewed with patient.  Patient encouraged to perform monthly skin exams.  UV precautions reviewed with patient.  Risks of non-melanoma skin  cancer discussed with patient   Return to clinic in one year or sooner if needed.